# Patient Record
Sex: FEMALE | Race: WHITE | Employment: PART TIME
[De-identification: names, ages, dates, MRNs, and addresses within clinical notes are randomized per-mention and may not be internally consistent; named-entity substitution may affect disease eponyms.]

---

## 2017-01-10 ENCOUNTER — OFFICE VISIT (OUTPATIENT)
Dept: OBGYN | Facility: CLINIC | Age: 47
End: 2017-01-10

## 2017-01-10 VITALS
BODY MASS INDEX: 24.59 KG/M2 | DIASTOLIC BLOOD PRESSURE: 77 MMHG | SYSTOLIC BLOOD PRESSURE: 127 MMHG | HEIGHT: 66 IN | HEART RATE: 74 BPM | WEIGHT: 153 LBS

## 2017-01-10 DIAGNOSIS — N89.8 VAGINAL DISCHARGE: Primary | ICD-10-CM

## 2017-01-10 PROCEDURE — 99213 OFFICE O/P EST LOW 20 MIN: CPT | Performed by: OBSTETRICS & GYNECOLOGY

## 2017-02-27 ENCOUNTER — HOSPITAL ENCOUNTER (OUTPATIENT)
Dept: MAMMOGRAPHY | Age: 47
Discharge: HOME | End: 2017-02-27
Payer: COMMERCIAL

## 2017-02-27 DIAGNOSIS — Z12.39 BREAST CANCER SCREENING: ICD-10-CM

## 2017-02-27 PROCEDURE — G0202 SCR MAMMO BI INCL CAD: HCPCS

## 2017-02-27 PROCEDURE — 77067 SCR MAMMO BI INCL CAD: CPT | Performed by: RADIOLOGY

## 2017-06-07 ENCOUNTER — HOSPITAL ENCOUNTER (OUTPATIENT)
Dept: PULMONOLOGY | Age: 47
Discharge: HOME | End: 2017-06-07
Payer: COMMERCIAL

## 2017-09-13 ENCOUNTER — HOSPITAL ENCOUNTER (OUTPATIENT)
Dept: PULMONOLOGY | Age: 47
Discharge: HOME | End: 2017-09-13
Payer: COMMERCIAL

## 2017-09-13 PROCEDURE — 94726 PLETHYSMOGRAPHY LUNG VOLUMES: CPT

## 2017-09-13 PROCEDURE — 94727 GAS DIL/WSHOT DETER LNG VOL: CPT

## 2017-09-13 PROCEDURE — 94010 BREATHING CAPACITY TEST: CPT

## 2017-09-13 PROCEDURE — 94729 DIFFUSING CAPACITY: CPT

## 2017-09-13 PROCEDURE — 88738 HGB QUANT TRANSCUTANEOUS: CPT

## 2017-09-13 PROCEDURE — 94728 AIRWY RESIST BY OSCILLOMETRY: CPT

## 2017-09-29 ENCOUNTER — HOSPITAL ENCOUNTER (OUTPATIENT)
Age: 47
Discharge: HOME | End: 2017-09-29
Payer: COMMERCIAL

## 2017-09-29 LAB
ABSOLUTE EOS #: 0.1 K/UL (ref 0–0.4)
ABSOLUTE LYMPH #: 1.6 K/UL (ref 1–4.8)
ABSOLUTE MONO #: 0.3 K/UL (ref 0.1–1.2)
ALBUMIN SERPL-MCNC: 4.1 G/DL (ref 3.5–5.2)
ALBUMIN/GLOBULIN RATIO: 1.6 (ref 1–2.5)
ALP BLD-CCNC: 44 U/L (ref 35–104)
ALT SERPL-CCNC: 16 U/L (ref 5–33)
ANION GAP SERPL CALCULATED.3IONS-SCNC: 12 MMOL/L (ref 9–17)
AST SERPL-CCNC: 18 U/L
BASOPHILS # BLD: 1 %
BASOPHILS ABSOLUTE: 0 K/UL (ref 0–0.2)
BILIRUB SERPL-MCNC: 0.79 MG/DL (ref 0.3–1.2)
BUN BLDV-MCNC: 17 MG/DL (ref 6–20)
BUN/CREAT BLD: NORMAL (ref 9–20)
CALCIUM SERPL-MCNC: 9.2 MG/DL (ref 8.6–10.4)
CHLORIDE BLD-SCNC: 101 MMOL/L (ref 98–107)
CHOLESTEROL, FASTING: 261 MG/DL
CHOLESTEROL/HDL RATIO: 2.6
CO2: 24 MMOL/L (ref 20–31)
CREAT SERPL-MCNC: 0.64 MG/DL (ref 0.5–0.9)
DIFFERENTIAL TYPE: NORMAL
EOSINOPHILS RELATIVE PERCENT: 2 %
GFR AFRICAN AMERICAN: >60 ML/MIN
GFR NON-AFRICAN AMERICAN: >60 ML/MIN
GFR SERPL CREATININE-BSD FRML MDRD: NORMAL ML/MIN/{1.73_M2}
GFR SERPL CREATININE-BSD FRML MDRD: NORMAL ML/MIN/{1.73_M2}
GLUCOSE BLD-MCNC: 88 MG/DL (ref 70–99)
HCT VFR BLD CALC: 42.4 % (ref 36–46)
HDLC SERPL-MCNC: 102 MG/DL
HEMOGLOBIN: 14.3 G/DL (ref 12–16)
LDL CHOLESTEROL: 152 MG/DL (ref 0–130)
LYMPHOCYTES # BLD: 29 %
MCH RBC QN AUTO: 31.5 PG (ref 26–34)
MCHC RBC AUTO-ENTMCNC: 33.6 G/DL (ref 31–37)
MCV RBC AUTO: 93.7 FL (ref 80–100)
MONOCYTES # BLD: 5 %
PDW BLD-RTO: 13.4 % (ref 12.5–15.4)
PLATELET # BLD: 225 K/UL (ref 140–450)
PLATELET ESTIMATE: NORMAL
PMV BLD AUTO: 8.2 FL (ref 6–12)
POTASSIUM SERPL-SCNC: 4.4 MMOL/L (ref 3.7–5.3)
RBC # BLD: 4.53 M/UL (ref 4–5.2)
RBC # BLD: NORMAL 10*6/UL
SEG NEUTROPHILS: 63 %
SEGMENTED NEUTROPHILS ABSOLUTE COUNT: 3.6 K/UL (ref 1.8–7.7)
SODIUM BLD-SCNC: 137 MMOL/L (ref 135–144)
T3 FREE: 2.2 PG/ML (ref 2.02–4.43)
THYROXINE, FREE: 1.08 NG/DL (ref 0.93–1.7)
TOTAL PROTEIN: 6.7 G/DL (ref 6.4–8.3)
TRIGLYCERIDE, FASTING: 35 MG/DL
TSH SERPL DL<=0.05 MIU/L-ACNC: 2.6 MIU/L (ref 0.3–5)
VITAMIN D 25-HYDROXY: 44.5 NG/ML (ref 30–100)
VLDLC SERPL CALC-MCNC: ABNORMAL MG/DL (ref 1–30)
WBC # BLD: 5.6 K/UL (ref 3.5–11)
WBC # BLD: NORMAL 10*3/UL

## 2017-09-29 PROCEDURE — 85025 COMPLETE CBC W/AUTO DIFF WBC: CPT

## 2017-09-29 PROCEDURE — 36415 COLL VENOUS BLD VENIPUNCTURE: CPT

## 2017-09-29 PROCEDURE — 84481 FREE ASSAY (FT-3): CPT

## 2017-09-29 PROCEDURE — 80053 COMPREHEN METABOLIC PANEL: CPT

## 2017-09-29 PROCEDURE — 80061 LIPID PANEL: CPT

## 2017-09-29 PROCEDURE — 84443 ASSAY THYROID STIM HORMONE: CPT

## 2017-09-29 PROCEDURE — 84439 ASSAY OF FREE THYROXINE: CPT

## 2017-09-29 PROCEDURE — 82306 VITAMIN D 25 HYDROXY: CPT

## 2017-10-05 ENCOUNTER — OFFICE VISIT (OUTPATIENT)
Dept: OBGYN CLINIC | Age: 47
End: 2017-10-05
Payer: COMMERCIAL

## 2017-10-05 ENCOUNTER — HOSPITAL ENCOUNTER (OUTPATIENT)
Age: 47
Setting detail: SPECIMEN
Discharge: HOME | End: 2017-10-05
Payer: COMMERCIAL

## 2017-10-05 VITALS
DIASTOLIC BLOOD PRESSURE: 79 MMHG | WEIGHT: 149 LBS | RESPIRATION RATE: 16 BRPM | BODY MASS INDEX: 23.95 KG/M2 | SYSTOLIC BLOOD PRESSURE: 128 MMHG | OXYGEN SATURATION: 100 % | HEIGHT: 66 IN

## 2017-10-05 DIAGNOSIS — Z01.419 ENCOUNTER FOR ROUTINE GYNECOLOGICAL EXAMINATION WITH PAPANICOLAOU SMEAR OF CERVIX: Primary | ICD-10-CM

## 2017-10-05 PROCEDURE — 99396 PREV VISIT EST AGE 40-64: CPT | Performed by: OBSTETRICS & GYNECOLOGY

## 2017-10-05 NOTE — PROGRESS NOTES
PHYSICAL Exam:     Constitutional:  Blood pressure 128/79, resp. rate 16, height 5' 6\" (1.676 m), weight 149 lb (67.6 kg), last menstrual period 10/01/2017, SpO2 100 %, not currently breastfeeding. General Appearance: This  is a well Developed, well Nourished, well groomed female. Her BMI was reviewed. Nutritional decision making was discussed. Skin:  There was a Normal Inspection of the skin without rashes or lesions. There were no rashes. (Papular, Maculopapular, Hives, Pustular, Macular)     There were no lesions (Ulcers, Erythema, Abn. Appearing Nevi)        Lymphatic:  No Lymph Nodes were Palpable in the neck , axilla or groin. Inguinal lymph nodes wnl   Neck and EENT:  The neck was supple. There were no masses   The thyroid was not enlarged and had no masses. Perrla, EOMI B/L,   Respiratory: The lungs were auscultated and found to be clear. There were no rales, rhonchi or wheezes. There was a good respiratory effort. Cardiovascular: The heart was in a regular rate and rhythm. . No S3 or S4. There was no murmur appreciated. Location, grade, and radiation are not applicable. Extremities: The patients extremities were without calf tenderness, edema, or varicosities. There was full range of motion in all four extremities    Abdomen: The abdomen was soft and non-tender. There were good bowel sounds in all quadrants and there was no guarding, rebound or rigidity. On evaluation there was no evidence of hepatosplenomegaly and there was no costal vertebral david tenderness bilaterally. No hernias were appreciated. Abdominal Scars: healed    Psych: The patient had a normal Orientation to: Time, Place, Person, and Situation  There is no Mood / Affect changes    Breast:  (Chest)  normal appearance, no masses or tenderness  Self breast exams were reviewed in detail. Literature was given.     Pelvic Exam:  Vulva and vagina appear normal. Bimanual exam reveals normal uterus and adnexa. Rectal Exam:  def      Musculosk:  Normal Gait and station was noted. Digits were evaluated without abnormal findings. Range of motion, stability and strength were evaluated and found to be appropriate for the patients age. ASSESSMENT:      55 y.o. Annual  1. Encounter for routine gynecological examination with Papanicolaou smear of cervix         Chief Complaint   Patient presents with    Gynecologic Exam     4/2013 wnl last jacinto was 2/27/17 wnl           Past Medical History:   Diagnosis Date    Anemia     Asthma     Lactose intolerance     Menometrorrhagia     Menorrhagia     Miscarriage          Patient Active Problem List   Diagnosis    Urinary incontinence          Hereditary Breast, Ovarian, Colon and Uterine Cancer screening Done. Tobacco & Secondary smoke risks reviewed; instructed on cessation and avoidance      Counseling Completed: Annual  Labs for MB and tailor therapy with his lab           PLAN:    Repeat pap every 1 year if negative. Mammograms every 1 year. If 37 yo and last mammogram was negative. Calcium and Vitamin D dosing reviewed. Colonoscopy screening reviewed as well as onset for bone density testing. Birth control and barrier recommendations discussed. STD counseling and prevention reviewed. Gardisil counseling completed for all patients 7-33 yo. Routine health maintenance per patients PCP.

## 2017-10-12 LAB — CYTOLOGY REPORT: NORMAL

## 2017-10-25 ENCOUNTER — HOSPITAL ENCOUNTER (OUTPATIENT)
Age: 47
Discharge: HOME | End: 2017-10-25
Payer: COMMERCIAL

## 2017-10-25 LAB
CORTISOL COLLECTION INFO: NORMAL
CORTISOL: 15.3 UG/DL (ref 2.7–18.4)
ESTRADIOL LEVEL: 68 PG/ML (ref 27–314)
PROGESTERONE LEVEL: 5.26 NG/ML
T3 FREE: 2.34 PG/ML (ref 2.02–4.43)
TESTOSTERONE TOTAL: 22 NG/DL (ref 20–70)
THYROGLOBULIN AB: <20 IU/ML (ref 0–40)
THYROGLOBULIN: 28.4 NG/ML (ref 0–63.4)
THYROID PEROXIDASE (TPO) AB: <10 IU/ML (ref 0–35)
THYROXINE, FREE: 1.24 NG/DL (ref 0.93–1.7)
TSH SERPL DL<=0.05 MIU/L-ACNC: 1.76 MIU/L (ref 0.3–5)
VITAMIN D 25-HYDROXY: 48.9 NG/ML (ref 30–100)

## 2017-10-25 PROCEDURE — 82306 VITAMIN D 25 HYDROXY: CPT

## 2017-10-25 PROCEDURE — 86376 MICROSOMAL ANTIBODY EACH: CPT

## 2017-10-25 PROCEDURE — 84439 ASSAY OF FREE THYROXINE: CPT

## 2017-10-25 PROCEDURE — 82679 ASSAY OF ESTRONE: CPT

## 2017-10-25 PROCEDURE — 84482 T3 REVERSE: CPT

## 2017-10-25 PROCEDURE — 86800 THYROGLOBULIN ANTIBODY: CPT

## 2017-10-25 PROCEDURE — 82627 DEHYDROEPIANDROSTERONE: CPT

## 2017-10-25 PROCEDURE — 84481 FREE ASSAY (FT-3): CPT

## 2017-10-25 PROCEDURE — 82670 ASSAY OF TOTAL ESTRADIOL: CPT

## 2017-10-25 PROCEDURE — 84432 ASSAY OF THYROGLOBULIN: CPT

## 2017-10-25 PROCEDURE — 84403 ASSAY OF TOTAL TESTOSTERONE: CPT

## 2017-10-25 PROCEDURE — 84443 ASSAY THYROID STIM HORMONE: CPT

## 2017-10-25 PROCEDURE — 36415 COLL VENOUS BLD VENIPUNCTURE: CPT

## 2017-10-25 PROCEDURE — 82533 TOTAL CORTISOL: CPT

## 2017-10-25 PROCEDURE — 84144 ASSAY OF PROGESTERONE: CPT

## 2017-10-27 LAB — DHEAS (DHEA SULFATE): 79.1 UG/DL (ref 32–240)

## 2017-10-29 LAB
ESTRONE: 59.2 PG/ML
T3 REVERSE: 17.5 NG/DL (ref 9–27)

## 2017-12-19 ENCOUNTER — HOSPITAL ENCOUNTER (OUTPATIENT)
Age: 47
Discharge: HOME | End: 2017-12-19
Payer: COMMERCIAL

## 2017-12-19 ENCOUNTER — HOSPITAL ENCOUNTER (OUTPATIENT)
Dept: ULTRASOUND IMAGING | Age: 47
Discharge: HOME | End: 2017-12-19
Payer: COMMERCIAL

## 2017-12-19 DIAGNOSIS — Z83.49 FAMILY HISTORY OF HYPOTHYROIDISM: ICD-10-CM

## 2017-12-19 DIAGNOSIS — R53.83 OTHER FATIGUE: ICD-10-CM

## 2017-12-19 LAB
C-REACTIVE PROTEIN: 0.5 MG/L (ref 0–5)
HOMOCYSTEINE: 6.3 UMOL/L
INSULIN COMMENT: NORMAL
INSULIN REFERENCE RANGE:: NORMAL
INSULIN: 5.7 MU/L

## 2017-12-19 PROCEDURE — 86140 C-REACTIVE PROTEIN: CPT

## 2017-12-19 PROCEDURE — 76536 US EXAM OF HEAD AND NECK: CPT

## 2017-12-19 PROCEDURE — 36415 COLL VENOUS BLD VENIPUNCTURE: CPT

## 2017-12-19 PROCEDURE — 83090 ASSAY OF HOMOCYSTEINE: CPT

## 2017-12-19 PROCEDURE — 83036 HEMOGLOBIN GLYCOSYLATED A1C: CPT

## 2017-12-19 PROCEDURE — 83525 ASSAY OF INSULIN: CPT

## 2017-12-20 LAB
ESTIMATED AVERAGE GLUCOSE: 82 MG/DL
HBA1C MFR BLD: 4.5 % (ref 4–6)

## 2018-10-10 ENCOUNTER — HOSPITAL ENCOUNTER (OUTPATIENT)
Age: 48
Setting detail: SPECIMEN
Discharge: HOME | End: 2018-10-10
Payer: COMMERCIAL

## 2018-10-10 ENCOUNTER — OFFICE VISIT (OUTPATIENT)
Dept: OBGYN CLINIC | Age: 48
End: 2018-10-10
Payer: COMMERCIAL

## 2018-10-10 VITALS
SYSTOLIC BLOOD PRESSURE: 122 MMHG | HEIGHT: 66 IN | WEIGHT: 156 LBS | DIASTOLIC BLOOD PRESSURE: 79 MMHG | BODY MASS INDEX: 25.07 KG/M2 | HEART RATE: 89 BPM

## 2018-10-10 DIAGNOSIS — Z12.39 SCREENING FOR BREAST CANCER: ICD-10-CM

## 2018-10-10 DIAGNOSIS — Z01.419 WELL FEMALE EXAM WITH ROUTINE GYNECOLOGICAL EXAM: Primary | ICD-10-CM

## 2018-10-10 PROCEDURE — 99396 PREV VISIT EST AGE 40-64: CPT | Performed by: OBSTETRICS & GYNECOLOGY

## 2018-10-10 NOTE — PROGRESS NOTES
Reversible Birth Control: No        Hormone Replacement Exposure: No      Family History of Breast, Ovarian , Colon or Uterine Cancer: No   If YES see scanned worksheet. Preventative Health Testing:  Date of Last Pap Smear: 2017  Abnormal Pap Smear History:   Colposcopy History:   Date of Last Mammogram: due  Date of Last Colonoscopy:   Date of Last Bone Density:      ________________________________________________________________________  REVIEW OF SYSTEMS:        A minimum of an eleven point review of systems was completed and found to be negative except for the pertinent positives found below. Constitutional: No fever, chills or malaise; No weight change or fatigue  Head and Eyes: No vision, Headache, Dizziness or trauma in last 12 months  ENT ROS: No hearing, Tinnitis, sinus or taste problems  Hematological and Lymphatic ROS:No Lymphoma, Von Willebrand's, Hemophillia or Bleeding History  Psych ROS: No Depression, Homicidal thoughts,suicidal thoughts, or anxiety  Breast ROS: No prior breast abnormalities or lumps  Respiratory ROS: No SOB, Pneumoniae,Cough, or Pulmonary Embolism History  Cardiovascular ROS: No Chest Pain with Exertion, Palpitations, Syncope, Edema, Arrhythmia  Gastrointestinal ROS: No Indigestion, Heartburn, Nausea, vomiting, Diahrea, Constipation,or Bowel Changes; No Bloody Stools or melena  Genito-Urinary ROS: No Dysuria, Hematuria or Nocturia.  No Urinary Incontinence or Vaginal Discharge  Musculoskeletal ROS: No Arthralgia, Arthritis,Gout,Osteoporosis or Rheumatism  Neurological ROS: No CVA, Migraines, Epilepsy, Seizure Hx, or Limb Weakness  Dermatological ROS: No Rash, Itching, Hives, Mole Changes or Cancer                                                                                                                                                                                                                                PHYSICAL Exam:     Constitutional:  Blood pressure 122/79,

## 2018-10-11 ENCOUNTER — HOSPITAL ENCOUNTER (OUTPATIENT)
Dept: MAMMOGRAPHY | Age: 48
Discharge: HOME | End: 2018-10-13
Payer: COMMERCIAL

## 2018-10-11 DIAGNOSIS — Z12.39 SCREENING FOR BREAST CANCER: ICD-10-CM

## 2018-10-11 DIAGNOSIS — Z01.419 WELL FEMALE EXAM WITH ROUTINE GYNECOLOGICAL EXAM: ICD-10-CM

## 2018-10-11 PROCEDURE — 77067 SCR MAMMO BI INCL CAD: CPT

## 2018-10-15 ENCOUNTER — TELEPHONE (OUTPATIENT)
Dept: OBGYN CLINIC | Age: 48
End: 2018-10-15

## 2018-10-15 DIAGNOSIS — R92.2 DENSE BREAST TISSUE ON MAMMOGRAM: Primary | ICD-10-CM

## 2018-10-16 ENCOUNTER — HOSPITAL ENCOUNTER (OUTPATIENT)
Dept: MAMMOGRAPHY | Age: 48
Discharge: HOME | End: 2018-10-18
Payer: COMMERCIAL

## 2018-10-16 DIAGNOSIS — R92.2 DENSE BREAST TISSUE ON MAMMOGRAM: ICD-10-CM

## 2018-10-16 PROCEDURE — G0279 TOMOSYNTHESIS, MAMMO: HCPCS

## 2018-10-25 LAB — CYTOLOGY REPORT: NORMAL

## 2018-12-21 ENCOUNTER — OFFICE VISIT (OUTPATIENT)
Dept: PRIMARY CARE CLINIC | Age: 48
End: 2018-12-21
Payer: COMMERCIAL

## 2018-12-21 ENCOUNTER — HOSPITAL ENCOUNTER (OUTPATIENT)
Age: 48
Setting detail: SPECIMEN
Discharge: HOME | End: 2018-12-21
Payer: COMMERCIAL

## 2018-12-21 VITALS
TEMPERATURE: 99 F | SYSTOLIC BLOOD PRESSURE: 125 MMHG | DIASTOLIC BLOOD PRESSURE: 84 MMHG | OXYGEN SATURATION: 99 % | WEIGHT: 160 LBS | BODY MASS INDEX: 25.82 KG/M2 | HEART RATE: 86 BPM

## 2018-12-21 DIAGNOSIS — R30.0 DYSURIA: ICD-10-CM

## 2018-12-21 DIAGNOSIS — N30.00 ACUTE CYSTITIS WITHOUT HEMATURIA: ICD-10-CM

## 2018-12-21 DIAGNOSIS — J40 BRONCHITIS: Primary | ICD-10-CM

## 2018-12-21 LAB
BILIRUBIN, POC: NEGATIVE
BLOOD URINE, POC: NEGATIVE
CLARITY, POC: ABNORMAL
COLOR, POC: YELLOW
GLUCOSE URINE, POC: NEGATIVE
KETONES, POC: NEGATIVE
LEUKOCYTE EST, POC: POSITIVE
NITRITE, POC: ABNORMAL
PH, POC: 7.5
PROTEIN, POC: NEGATIVE
SPECIFIC GRAVITY, POC: 1
UROBILINOGEN, POC: 0.2

## 2018-12-21 PROCEDURE — 99213 OFFICE O/P EST LOW 20 MIN: CPT | Performed by: NURSE PRACTITIONER

## 2018-12-21 PROCEDURE — 81003 URINALYSIS AUTO W/O SCOPE: CPT | Performed by: NURSE PRACTITIONER

## 2018-12-21 RX ORDER — ALBUTEROL SULFATE 90 UG/1
AEROSOL, METERED RESPIRATORY (INHALATION)
COMMUNITY
End: 2019-03-20 | Stop reason: SDUPTHER

## 2018-12-21 RX ORDER — LEVOFLOXACIN 500 MG/1
500 TABLET, FILM COATED ORAL DAILY
Qty: 7 TABLET | Refills: 0 | Status: SHIPPED | OUTPATIENT
Start: 2018-12-21 | End: 2018-12-28

## 2018-12-21 RX ORDER — ALBUTEROL SULFATE 90 UG/1
2 AEROSOL, METERED RESPIRATORY (INHALATION) EVERY 6 HOURS PRN
Qty: 1 INHALER | Refills: 0 | Status: SHIPPED | OUTPATIENT
Start: 2018-12-21

## 2018-12-21 RX ORDER — PREDNISONE 20 MG/1
40 TABLET ORAL DAILY
Qty: 10 TABLET | Refills: 0 | Status: SHIPPED | OUTPATIENT
Start: 2018-12-21 | End: 2018-12-26

## 2018-12-21 ASSESSMENT — ENCOUNTER SYMPTOMS
SORE THROAT: 0
COUGH: 1
SINUS PRESSURE: 0
WHEEZING: 1
SHORTNESS OF BREATH: 0

## 2018-12-21 NOTE — PROGRESS NOTES
Visit Information    Have you changed or started any medications since your last visit including any over-the-counter medicines, vitamins, or herbal medicines? no   Are you having any side effects from any of your medications? -  no  Have you stopped taking any of your medications? Is so, why? -  no    Have you seen any other physician or provider since your last visit? No  Have you had any other diagnostic tests since your last visit? No  Have you been seen in the emergency room and/or had an admission to a hospital since we last saw you? No  Have you had your routine dental cleaning in the past 6 months? yes -    Have you activated your Variable account? If not, what are your barriers?  No     Patient Care Team:  Myla Hernandez MD as PCP - General (Family Medicine)    Medical History Review  Past Medical, Family, and Social History reviewed and does not contribute to the patient presenting condition    Health Maintenance   Topic Date Due    HIV screen  10/18/1985    DTaP/Tdap/Td vaccine (1 - Tdap) 10/18/1989    Cervical cancer screen  10/10/2021    Lipid screen  09/29/2022    Flu vaccine  Completed

## 2018-12-23 LAB
CULTURE: ABNORMAL
Lab: ABNORMAL
ORGANISM: ABNORMAL
SPECIMEN DESCRIPTION: ABNORMAL
STATUS: ABNORMAL

## 2018-12-28 ENCOUNTER — HOSPITAL ENCOUNTER (OUTPATIENT)
Age: 48
Discharge: HOME | End: 2018-12-30
Payer: COMMERCIAL

## 2018-12-28 ENCOUNTER — HOSPITAL ENCOUNTER (OUTPATIENT)
Dept: GENERAL RADIOLOGY | Age: 48
Discharge: HOME | End: 2018-12-30
Payer: COMMERCIAL

## 2018-12-28 DIAGNOSIS — M54.2 NECK PAIN: ICD-10-CM

## 2018-12-28 DIAGNOSIS — M54.42 ACUTE BACK PAIN WITH SCIATICA, LEFT: ICD-10-CM

## 2018-12-28 PROCEDURE — 72040 X-RAY EXAM NECK SPINE 2-3 VW: CPT

## 2018-12-28 PROCEDURE — 72100 X-RAY EXAM L-S SPINE 2/3 VWS: CPT

## 2019-02-27 ENCOUNTER — HOSPITAL ENCOUNTER (OUTPATIENT)
Age: 49
Discharge: HOME | End: 2019-02-27
Payer: COMMERCIAL

## 2019-02-27 LAB
-: ABNORMAL
AMORPHOUS: ABNORMAL
BACTERIA: ABNORMAL
BILIRUBIN URINE: NEGATIVE
CASTS UA: ABNORMAL /LPF (ref 0–2)
COLOR: YELLOW
COMMENT UA: ABNORMAL
CRYSTALS, UA: ABNORMAL /HPF
EPITHELIAL CELLS UA: ABNORMAL /HPF (ref 0–5)
GLUCOSE URINE: NEGATIVE
KETONES, URINE: ABNORMAL
LEUKOCYTE ESTERASE, URINE: ABNORMAL
MUCUS: ABNORMAL
NITRITE, URINE: NEGATIVE
OTHER OBSERVATIONS UA: ABNORMAL
PH UA: 8 (ref 5–8)
PROTEIN UA: ABNORMAL
RBC UA: ABNORMAL /HPF (ref 0–2)
RENAL EPITHELIAL, UA: ABNORMAL /HPF
SPECIFIC GRAVITY UA: 1.03 (ref 1–1.03)
TRICHOMONAS: ABNORMAL
TURBIDITY: CLEAR
URINE HGB: NEGATIVE
UROBILINOGEN, URINE: NORMAL
WBC UA: ABNORMAL /HPF (ref 0–5)
YEAST: ABNORMAL

## 2019-02-27 PROCEDURE — 87086 URINE CULTURE/COLONY COUNT: CPT

## 2019-02-27 PROCEDURE — 81001 URINALYSIS AUTO W/SCOPE: CPT

## 2019-02-28 LAB
CULTURE: NORMAL
Lab: NORMAL
SPECIMEN DESCRIPTION: NORMAL

## 2019-06-02 ENCOUNTER — HOSPITAL ENCOUNTER (OUTPATIENT)
Age: 49
Setting detail: SPECIMEN
Discharge: HOME | End: 2019-06-02
Payer: COMMERCIAL

## 2019-06-02 PROCEDURE — 87086 URINE CULTURE/COLONY COUNT: CPT

## 2019-06-02 PROCEDURE — 87077 CULTURE AEROBIC IDENTIFY: CPT

## 2019-06-02 PROCEDURE — 87186 SC STD MICRODIL/AGAR DIL: CPT

## 2019-06-03 LAB
CULTURE: ABNORMAL
Lab: ABNORMAL
SPECIMEN DESCRIPTION: ABNORMAL

## 2019-06-19 ENCOUNTER — HOSPITAL ENCOUNTER (OUTPATIENT)
Age: 49
Setting detail: SPECIMEN
Discharge: HOME | End: 2019-06-19
Payer: COMMERCIAL

## 2019-06-19 DIAGNOSIS — N39.0 RECURRENT UTI: ICD-10-CM

## 2019-06-19 PROBLEM — R39.15 URGENCY OF URINATION: Status: ACTIVE | Noted: 2019-06-19

## 2019-06-19 PROBLEM — R35.0 FREQUENCY OF URINATION: Status: ACTIVE | Noted: 2019-06-19

## 2019-06-20 LAB
CULTURE: NORMAL
Lab: NORMAL
SPECIMEN DESCRIPTION: NORMAL

## 2019-08-22 ENCOUNTER — HOSPITAL ENCOUNTER (OUTPATIENT)
Age: 49
Discharge: HOME | End: 2019-08-22
Payer: COMMERCIAL

## 2019-08-22 ENCOUNTER — HOSPITAL ENCOUNTER (OUTPATIENT)
Dept: ULTRASOUND IMAGING | Age: 49
Discharge: HOME | End: 2019-08-24
Payer: COMMERCIAL

## 2019-08-22 DIAGNOSIS — E03.8 SUBCLINICAL HYPOTHYROIDISM: ICD-10-CM

## 2019-08-22 LAB
ABSOLUTE EOS #: 0.09 K/UL (ref 0–0.44)
ABSOLUTE IMMATURE GRANULOCYTE: 0.03 K/UL (ref 0–0.3)
ABSOLUTE LYMPH #: 1.73 K/UL (ref 1.1–3.7)
ABSOLUTE MONO #: 0.34 K/UL (ref 0.1–1.2)
ALBUMIN SERPL-MCNC: 4.5 G/DL (ref 3.5–5.2)
ALBUMIN/GLOBULIN RATIO: 1.7 (ref 1–2.5)
ALP BLD-CCNC: 43 U/L (ref 35–104)
ALT SERPL-CCNC: 9 U/L (ref 5–33)
ANION GAP SERPL CALCULATED.3IONS-SCNC: 10 MMOL/L (ref 9–17)
AST SERPL-CCNC: 12 U/L
BASOPHILS # BLD: 1 % (ref 0–2)
BASOPHILS ABSOLUTE: 0.03 K/UL (ref 0–0.2)
BILIRUB SERPL-MCNC: 0.95 MG/DL (ref 0.3–1.2)
BUN BLDV-MCNC: 11 MG/DL (ref 6–20)
BUN/CREAT BLD: NORMAL (ref 9–20)
CALCIUM SERPL-MCNC: 9.4 MG/DL (ref 8.6–10.4)
CHLORIDE BLD-SCNC: 104 MMOL/L (ref 98–107)
CHOLESTEROL, FASTING: 214 MG/DL
CHOLESTEROL/HDL RATIO: 3.1
CO2: 24 MMOL/L (ref 20–31)
CREAT SERPL-MCNC: 0.62 MG/DL (ref 0.5–0.9)
DIFFERENTIAL TYPE: ABNORMAL
EOSINOPHILS RELATIVE PERCENT: 2 % (ref 1–4)
FERRITIN: 125 UG/L (ref 13–150)
GFR AFRICAN AMERICAN: >60 ML/MIN
GFR NON-AFRICAN AMERICAN: >60 ML/MIN
GFR SERPL CREATININE-BSD FRML MDRD: NORMAL ML/MIN/{1.73_M2}
GFR SERPL CREATININE-BSD FRML MDRD: NORMAL ML/MIN/{1.73_M2}
GLUCOSE FASTING: 83 MG/DL (ref 70–99)
HCT VFR BLD CALC: 47.2 % (ref 36.3–47.1)
HDLC SERPL-MCNC: 69 MG/DL
HEMOGLOBIN: 14.7 G/DL (ref 11.9–15.1)
IMMATURE GRANULOCYTES: 1 %
IRON SATURATION: 74 % (ref 20–55)
IRON: 198 UG/DL (ref 37–145)
LDL CHOLESTEROL: 130 MG/DL (ref 0–130)
LYMPHOCYTES # BLD: 30 % (ref 24–43)
MCH RBC QN AUTO: 29.9 PG (ref 25.2–33.5)
MCHC RBC AUTO-ENTMCNC: 31.1 G/DL (ref 28.4–34.8)
MCV RBC AUTO: 96.1 FL (ref 82.6–102.9)
MONOCYTES # BLD: 6 % (ref 3–12)
NRBC AUTOMATED: 0 PER 100 WBC
PDW BLD-RTO: 12 % (ref 11.8–14.4)
PLATELET # BLD: 263 K/UL (ref 138–453)
PLATELET ESTIMATE: ABNORMAL
PMV BLD AUTO: 10.6 FL (ref 8.1–13.5)
POTASSIUM SERPL-SCNC: 4.5 MMOL/L (ref 3.7–5.3)
RBC # BLD: 4.91 M/UL (ref 3.95–5.11)
RBC # BLD: ABNORMAL 10*6/UL
SEG NEUTROPHILS: 60 % (ref 36–65)
SEGMENTED NEUTROPHILS ABSOLUTE COUNT: 3.59 K/UL (ref 1.5–8.1)
SODIUM BLD-SCNC: 138 MMOL/L (ref 135–144)
T3 FREE: 3.46 PG/ML (ref 2.02–4.43)
THYROXINE, FREE: 1.32 NG/DL (ref 0.93–1.7)
TOTAL IRON BINDING CAPACITY: 266 UG/DL (ref 250–450)
TOTAL PROTEIN: 7.2 G/DL (ref 6.4–8.3)
TRIGLYCERIDE, FASTING: 77 MG/DL
TSH SERPL DL<=0.05 MIU/L-ACNC: 2.1 MIU/L (ref 0.3–5)
UNSATURATED IRON BINDING CAPACITY: 68 UG/DL (ref 112–347)
VITAMIN B-12: 408 PG/ML (ref 232–1245)
VITAMIN D 25-HYDROXY: 35.4 NG/ML (ref 30–100)
VLDLC SERPL CALC-MCNC: ABNORMAL MG/DL (ref 1–30)
WBC # BLD: 5.8 K/UL (ref 3.5–11.3)
WBC # BLD: ABNORMAL 10*3/UL

## 2019-08-22 PROCEDURE — 82607 VITAMIN B-12: CPT

## 2019-08-22 PROCEDURE — 84445 ASSAY OF TSI GLOBULIN: CPT

## 2019-08-22 PROCEDURE — 84481 FREE ASSAY (FT-3): CPT

## 2019-08-22 PROCEDURE — 82728 ASSAY OF FERRITIN: CPT

## 2019-08-22 PROCEDURE — 85025 COMPLETE CBC W/AUTO DIFF WBC: CPT

## 2019-08-22 PROCEDURE — 83540 ASSAY OF IRON: CPT

## 2019-08-22 PROCEDURE — 84482 T3 REVERSE: CPT

## 2019-08-22 PROCEDURE — 36415 COLL VENOUS BLD VENIPUNCTURE: CPT

## 2019-08-22 PROCEDURE — 80061 LIPID PANEL: CPT

## 2019-08-22 PROCEDURE — 84439 ASSAY OF FREE THYROXINE: CPT

## 2019-08-22 PROCEDURE — 84443 ASSAY THYROID STIM HORMONE: CPT

## 2019-08-22 PROCEDURE — 86376 MICROSOMAL ANTIBODY EACH: CPT

## 2019-08-22 PROCEDURE — 80053 COMPREHEN METABOLIC PANEL: CPT

## 2019-08-22 PROCEDURE — 76536 US EXAM OF HEAD AND NECK: CPT

## 2019-08-22 PROCEDURE — 83550 IRON BINDING TEST: CPT

## 2019-08-22 PROCEDURE — 86800 THYROGLOBULIN ANTIBODY: CPT

## 2019-08-22 PROCEDURE — 82306 VITAMIN D 25 HYDROXY: CPT

## 2019-08-23 LAB
THYROGLOBULIN AB: <20 IU/ML (ref 0–40)
THYROID PEROXIDASE (TPO) AB: 10.7 IU/ML (ref 0–35)

## 2019-08-27 LAB — THYROID STIMULATING IMMUNOGLOB: 98 %

## 2019-08-29 LAB — T3 REVERSE: 14.6 NG/DL (ref 9–27)

## 2019-10-15 ENCOUNTER — OFFICE VISIT (OUTPATIENT)
Dept: OBGYN CLINIC | Age: 49
End: 2019-10-15
Payer: COMMERCIAL

## 2019-10-15 ENCOUNTER — HOSPITAL ENCOUNTER (OUTPATIENT)
Age: 49
Setting detail: SPECIMEN
Discharge: HOME | End: 2019-10-15
Payer: COMMERCIAL

## 2019-10-15 VITALS
SYSTOLIC BLOOD PRESSURE: 118 MMHG | WEIGHT: 155 LBS | BODY MASS INDEX: 24.91 KG/M2 | HEIGHT: 66 IN | DIASTOLIC BLOOD PRESSURE: 72 MMHG

## 2019-10-15 DIAGNOSIS — Z01.419 ENCOUNTER FOR GYNECOLOGICAL EXAMINATION WITHOUT ABNORMAL FINDING: Primary | ICD-10-CM

## 2019-10-15 DIAGNOSIS — Z12.31 VISIT FOR SCREENING MAMMOGRAM: ICD-10-CM

## 2019-10-15 PROCEDURE — 99396 PREV VISIT EST AGE 40-64: CPT | Performed by: OBSTETRICS & GYNECOLOGY

## 2019-10-15 RX ORDER — CHOLECALCIFEROL (VITAMIN D3) 125 MCG
500 CAPSULE ORAL DAILY
COMMUNITY

## 2019-10-18 LAB — CYTOLOGY REPORT: NORMAL

## 2019-11-12 ENCOUNTER — HOSPITAL ENCOUNTER (OUTPATIENT)
Dept: MAMMOGRAPHY | Age: 49
Discharge: HOME | End: 2019-11-14
Payer: COMMERCIAL

## 2019-11-12 DIAGNOSIS — Z12.31 VISIT FOR SCREENING MAMMOGRAM: ICD-10-CM

## 2019-11-12 PROCEDURE — 77063 BREAST TOMOSYNTHESIS BI: CPT

## 2019-12-11 ENCOUNTER — HOSPITAL ENCOUNTER (OUTPATIENT)
Age: 49
Discharge: HOME | End: 2019-12-11
Payer: COMMERCIAL

## 2019-12-11 LAB
ABSOLUTE EOS #: 0.18 K/UL (ref 0–0.44)
ABSOLUTE EOS #: 0.18 K/UL (ref 0–0.44)
ABSOLUTE IMMATURE GRANULOCYTE: <0.03 K/UL (ref 0–0.3)
ABSOLUTE IMMATURE GRANULOCYTE: <0.03 K/UL (ref 0–0.3)
ABSOLUTE LYMPH #: 1.28 K/UL (ref 1.1–3.7)
ABSOLUTE LYMPH #: 1.28 K/UL (ref 1.1–3.7)
ABSOLUTE MONO #: 0.34 K/UL (ref 0.1–1.2)
ABSOLUTE MONO #: 0.34 K/UL (ref 0.1–1.2)
ALBUMIN SERPL-MCNC: 4.2 G/DL (ref 3.5–5.2)
ALBUMIN/GLOBULIN RATIO: 1.7 (ref 1–2.5)
ALP BLD-CCNC: 48 U/L (ref 35–104)
ALT SERPL-CCNC: 14 U/L (ref 5–33)
ANION GAP SERPL CALCULATED.3IONS-SCNC: 10 MMOL/L (ref 9–17)
AST SERPL-CCNC: 16 U/L
BASOPHILS # BLD: 1 % (ref 0–2)
BASOPHILS # BLD: 1 % (ref 0–2)
BASOPHILS ABSOLUTE: 0.03 K/UL (ref 0–0.2)
BASOPHILS ABSOLUTE: 0.03 K/UL (ref 0–0.2)
BILIRUB SERPL-MCNC: 0.5 MG/DL (ref 0.3–1.2)
BILIRUBIN DIRECT: 0.13 MG/DL
BILIRUBIN, INDIRECT: 0.37 MG/DL (ref 0–1)
CERULOPLASMIN: 21 MG/DL (ref 16–45)
CHLORIDE BLD-SCNC: 108 MMOL/L (ref 98–107)
CO2: 24 MMOL/L (ref 20–31)
DIFFERENTIAL TYPE: ABNORMAL
DIFFERENTIAL TYPE: ABNORMAL
EOSINOPHILS RELATIVE PERCENT: 5 % (ref 1–4)
EOSINOPHILS RELATIVE PERCENT: 5 % (ref 1–4)
ESTRADIOL LEVEL: 44 PG/ML (ref 27–314)
FERRITIN: 32 UG/L (ref 13–150)
GLOBULIN: NORMAL G/DL (ref 1.5–3.8)
HCT VFR BLD CALC: 45.2 % (ref 36.3–47.1)
HCT VFR BLD CALC: 45.2 % (ref 36.3–47.1)
HEMOGLOBIN: 14.5 G/DL (ref 11.9–15.1)
HEMOGLOBIN: 14.5 G/DL (ref 11.9–15.1)
HIGH SENSITIVE C-REACTIVE PROTEIN: 0.9 MG/L
HOMOCYSTEINE: 5.8 UMOL/L
IMMATURE GRANULOCYTES: 0 %
IMMATURE GRANULOCYTES: 0 %
IRON SATURATION: 39 % (ref 20–55)
IRON: 101 UG/DL (ref 37–145)
LYMPHOCYTES # BLD: 34 % (ref 24–43)
LYMPHOCYTES # BLD: 34 % (ref 24–43)
MCH RBC QN AUTO: 30.9 PG (ref 25.2–33.5)
MCH RBC QN AUTO: 30.9 PG (ref 25.2–33.5)
MCHC RBC AUTO-ENTMCNC: 32.1 G/DL (ref 28.4–34.8)
MCHC RBC AUTO-ENTMCNC: 32.1 G/DL (ref 28.4–34.8)
MCV RBC AUTO: 96.2 FL (ref 82.6–102.9)
MCV RBC AUTO: 96.2 FL (ref 82.6–102.9)
MONOCYTES # BLD: 9 % (ref 3–12)
MONOCYTES # BLD: 9 % (ref 3–12)
NRBC AUTOMATED: 0 PER 100 WBC
NRBC AUTOMATED: 0 PER 100 WBC
PDW BLD-RTO: 12.5 % (ref 11.8–14.4)
PDW BLD-RTO: 12.5 % (ref 11.8–14.4)
PLATELET # BLD: 256 K/UL (ref 138–453)
PLATELET # BLD: 256 K/UL (ref 138–453)
PLATELET ESTIMATE: ABNORMAL
PLATELET ESTIMATE: ABNORMAL
PMV BLD AUTO: 10.8 FL (ref 8.1–13.5)
PMV BLD AUTO: 10.8 FL (ref 8.1–13.5)
POTASSIUM SERPL-SCNC: 4.6 MMOL/L (ref 3.7–5.3)
PROGESTERONE LEVEL: 2.92 NG/ML
RBC # BLD: 4.7 M/UL (ref 3.95–5.11)
RBC # BLD: 4.7 M/UL (ref 3.95–5.11)
RBC # BLD: ABNORMAL 10*6/UL
RBC # BLD: ABNORMAL 10*6/UL
SEG NEUTROPHILS: 52 % (ref 36–65)
SEG NEUTROPHILS: 52 % (ref 36–65)
SEGMENTED NEUTROPHILS ABSOLUTE COUNT: 1.96 K/UL (ref 1.5–8.1)
SEGMENTED NEUTROPHILS ABSOLUTE COUNT: 1.96 K/UL (ref 1.5–8.1)
SEX HORMONE BINDING GLOBULIN: 123 NMOL/L (ref 30–135)
SODIUM BLD-SCNC: 142 MMOL/L (ref 135–144)
T3 FREE: 2.88 PG/ML (ref 2.02–4.43)
TESTOSTERONE FREE-NONMALE: <1 PG/ML (ref 1.1–5.8)
TESTOSTERONE TOTAL: 11 NG/DL (ref 20–70)
THYROXINE, FREE: 1.21 NG/DL (ref 0.93–1.7)
TOTAL IRON BINDING CAPACITY: 258 UG/DL (ref 250–450)
TOTAL PROTEIN: 6.7 G/DL (ref 6.4–8.3)
TSH SERPL DL<=0.05 MIU/L-ACNC: 1.65 MIU/L (ref 0.3–5)
UNSATURATED IRON BINDING CAPACITY: 157 UG/DL (ref 112–347)
VITAMIN B-12: 697 PG/ML (ref 232–1245)
WBC # BLD: 3.8 K/UL (ref 3.5–11.3)
WBC # BLD: 3.8 K/UL (ref 3.5–11.3)
WBC # BLD: ABNORMAL 10*3/UL
WBC # BLD: ABNORMAL 10*3/UL

## 2019-12-11 PROCEDURE — 82390 ASSAY OF CERULOPLASMIN: CPT

## 2019-12-11 PROCEDURE — 82627 DEHYDROEPIANDROSTERONE: CPT

## 2019-12-11 PROCEDURE — 84630 ASSAY OF ZINC: CPT

## 2019-12-11 PROCEDURE — 84443 ASSAY THYROID STIM HORMONE: CPT

## 2019-12-11 PROCEDURE — 87799 DETECT AGENT NOS DNA QUANT: CPT

## 2019-12-11 PROCEDURE — 84144 ASSAY OF PROGESTERONE: CPT

## 2019-12-11 PROCEDURE — 83540 ASSAY OF IRON: CPT

## 2019-12-11 PROCEDURE — 84403 ASSAY OF TOTAL TESTOSTERONE: CPT

## 2019-12-11 PROCEDURE — 86665 EPSTEIN-BARR CAPSID VCA: CPT

## 2019-12-11 PROCEDURE — 80076 HEPATIC FUNCTION PANEL: CPT

## 2019-12-11 PROCEDURE — 84439 ASSAY OF FREE THYROXINE: CPT

## 2019-12-11 PROCEDURE — 86663 EPSTEIN-BARR ANTIBODY: CPT

## 2019-12-11 PROCEDURE — 84270 ASSAY OF SEX HORMONE GLOBUL: CPT

## 2019-12-11 PROCEDURE — 86141 C-REACTIVE PROTEIN HS: CPT

## 2019-12-11 PROCEDURE — 85025 COMPLETE CBC W/AUTO DIFF WBC: CPT

## 2019-12-11 PROCEDURE — 83018 HEAVY METAL QUAN EACH NES: CPT

## 2019-12-11 PROCEDURE — 36415 COLL VENOUS BLD VENIPUNCTURE: CPT

## 2019-12-11 PROCEDURE — 83550 IRON BINDING TEST: CPT

## 2019-12-11 PROCEDURE — 82670 ASSAY OF TOTAL ESTRADIOL: CPT

## 2019-12-11 PROCEDURE — 83735 ASSAY OF MAGNESIUM: CPT

## 2019-12-11 PROCEDURE — 82728 ASSAY OF FERRITIN: CPT

## 2019-12-11 PROCEDURE — 86664 EPSTEIN-BARR NUCLEAR ANTIGEN: CPT

## 2019-12-11 PROCEDURE — 84481 FREE ASSAY (FT-3): CPT

## 2019-12-11 PROCEDURE — 83090 ASSAY OF HOMOCYSTEINE: CPT

## 2019-12-11 PROCEDURE — 84482 T3 REVERSE: CPT

## 2019-12-11 PROCEDURE — 82607 VITAMIN B-12: CPT

## 2019-12-11 PROCEDURE — 80051 ELECTROLYTE PANEL: CPT

## 2019-12-12 LAB
-: NORMAL
DHEAS (DHEA SULFATE): 61.1 UG/DL (ref 32–240)
EBV EARLY ANTIGEN IGG: 27 U/ML
EBV INTERPRETATION: ABNORMAL
EBV NUCLEAR AG AB: 701 U/ML
EPSTEIN-BARR VCA IGG: 1150 U/ML
EPSTEIN-BARR VCA IGM: 39 U/ML
REASON FOR REJECTION: NORMAL
ZZ NTE CLEAN UP: ORDERED TEST: NORMAL
ZZ NTE WITH NAME CLEAN UP: SPECIMEN SOURCE: NORMAL

## 2019-12-14 LAB
MISCELLANEOUS LAB TEST RESULT: NORMAL
TEST NAME: NORMAL

## 2019-12-15 LAB
T3 REVERSE: 13.6 NG/DL (ref 9–27)
ZINC: 70.1 UG/DL (ref 60–120)

## 2019-12-17 LAB
EBV, QUANT. COPY/ML: <390 CPY/ML
EBV, QUANT. INTERP: NOT DETECTED
EBV, QUANT. LOG: <2.6 LOG
EBV, QUANT. SOURCE: NORMAL

## 2019-12-19 ENCOUNTER — HOSPITAL ENCOUNTER (OUTPATIENT)
Age: 49
Discharge: HOME | End: 2019-12-19
Payer: COMMERCIAL

## 2019-12-19 PROCEDURE — 82525 ASSAY OF COPPER: CPT

## 2019-12-19 PROCEDURE — 83735 ASSAY OF MAGNESIUM: CPT

## 2019-12-19 PROCEDURE — 36415 COLL VENOUS BLD VENIPUNCTURE: CPT

## 2019-12-22 LAB — COPPER: 113.4 UG/DL (ref 80–155)

## 2019-12-23 LAB — MAGNESIUM RBC: 1.9 MMOL/L (ref 1.5–3.1)

## 2020-06-11 ENCOUNTER — HOSPITAL ENCOUNTER (OUTPATIENT)
Age: 50
Discharge: HOME | End: 2020-06-11
Payer: COMMERCIAL

## 2020-06-11 LAB
T3 FREE: 2.92 PG/ML (ref 2.02–4.43)
THYROXINE, FREE: 1.03 NG/DL (ref 0.93–1.7)
TSH SERPL DL<=0.05 MIU/L-ACNC: 0.75 MIU/L (ref 0.3–5)

## 2020-06-11 PROCEDURE — 84481 FREE ASSAY (FT-3): CPT

## 2020-06-11 PROCEDURE — 84439 ASSAY OF FREE THYROXINE: CPT

## 2020-06-11 PROCEDURE — 84482 T3 REVERSE: CPT

## 2020-06-11 PROCEDURE — 84443 ASSAY THYROID STIM HORMONE: CPT

## 2020-06-11 PROCEDURE — 36415 COLL VENOUS BLD VENIPUNCTURE: CPT

## 2020-06-14 LAB — T3 REVERSE: 15.1 NG/DL (ref 9–27)

## 2020-06-25 ENCOUNTER — HOSPITAL ENCOUNTER (OUTPATIENT)
Age: 50
Setting detail: SPECIMEN
Discharge: HOME | End: 2020-06-25
Payer: COMMERCIAL

## 2020-06-26 LAB — SARS-COV-2 ANTIBODY, TOTAL: NEGATIVE

## 2020-09-02 ENCOUNTER — HOSPITAL ENCOUNTER (OUTPATIENT)
Age: 50
Discharge: HOME | End: 2020-09-02
Payer: COMMERCIAL

## 2020-09-02 LAB
T3 FREE: 3.24 PG/ML (ref 2.02–4.43)
THYROXINE, FREE: 1.28 NG/DL (ref 0.93–1.7)
TSH SERPL DL<=0.05 MIU/L-ACNC: 0.08 MIU/L (ref 0.3–5)

## 2020-09-02 PROCEDURE — 84443 ASSAY THYROID STIM HORMONE: CPT

## 2020-09-02 PROCEDURE — 84439 ASSAY OF FREE THYROXINE: CPT

## 2020-09-02 PROCEDURE — 84481 FREE ASSAY (FT-3): CPT

## 2020-09-02 PROCEDURE — 36415 COLL VENOUS BLD VENIPUNCTURE: CPT

## 2020-11-24 ENCOUNTER — HOSPITAL ENCOUNTER (OUTPATIENT)
Age: 50
Setting detail: SPECIMEN
Discharge: HOME | End: 2020-11-24
Payer: COMMERCIAL

## 2020-11-24 ENCOUNTER — OFFICE VISIT (OUTPATIENT)
Dept: OBGYN CLINIC | Age: 50
End: 2020-11-24
Payer: COMMERCIAL

## 2020-11-24 VITALS
DIASTOLIC BLOOD PRESSURE: 70 MMHG | SYSTOLIC BLOOD PRESSURE: 110 MMHG | WEIGHT: 151 LBS | BODY MASS INDEX: 24.27 KG/M2 | HEIGHT: 66 IN

## 2020-11-24 PROCEDURE — 99396 PREV VISIT EST AGE 40-64: CPT | Performed by: OBSTETRICS & GYNECOLOGY

## 2020-11-24 RX ORDER — LEVOTHYROXINE AND LIOTHYRONINE 57; 13.5 UG/1; UG/1
90 TABLET ORAL DAILY
COMMUNITY
End: 2021-08-25

## 2020-11-24 NOTE — PATIENT INSTRUCTIONS
Please get your mammogram done as scheduled. We will contact you with those results as well as today's Pap smear. Return to the office in 1 year or as needed. Happy Thanksgiving, stay safe and have a healthy year!

## 2020-11-24 NOTE — PROGRESS NOTES
DATE OF VISIT:  20        History and Physical    Simona Baltazar    :  1970  CHIEF COMPLAINT:    Chief Complaint   Patient presents with    Annual Exam     Here for annual  Last pap 10/15/19 neg  Last mammogram 19 neg                    HPI :   Simona Baltazar is a 48 y.o. femaleGRAVIDA 6 PARA     Simona Baltazar returns today for her annual exam.  She has a history of an endometrial ablation for abnormal bleeding but was having monthly menses which were light with no significant cramping. Her last menses was in September. She believes she is having some infrequent hot flashes but no other vasomotor symptoms. She is having regular bowel movements without constipation or diarrhea. 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 She is otherwise without any significant complaints today. Brook Khan also has a history of hemochromatosis, was previously followed by hematology and told that she may no longer require routine phlebotomies.   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  _____________________________________________________________________  Past Medical History:   Diagnosis Date    Anemia     Asthma     Caffeine use     decaf coffee and 1 can pop/day    Frequency of urination     Hyperlipidemia     Hypothyroidism     Lactose intolerance     Menometrorrhagia     Menorrhagia     Miscarriage     Recurrent UTI     Urgency of urination                                                                    Past Surgical History:   Procedure Laterality Date     SECTION  ,     CYSTOSCOPY      DILATION AND CURETTAGE  2008    ENDOMETRIAL ABLATION  10/31/2010    due to menometrorrhagia    ENDOMETRIAL BIOPSY  2010    due to menorrhagia    OTHER SURGICAL HISTORY      as child- bladder outflow obstruction repain     Family History   Problem Relation Heavy/irregular periods           no                                      Vaginal discharge                   no  Hematological: Bruises easy   no     Endocrine:  Nipple Discharge  no     Hot/Cold Intolerance  no   Psychological:  Mood and affect were wnl yes                                                                                                                                           Physical Exam:    Vitals:    11/24/20 1300   BP: 110/70   Site: Right Upper Arm   Position: Sitting   Cuff Size: Medium Adult   Weight: 151 lb (68.5 kg)   Height: 5' 6\" (1.676 m)       General Appearance:  She does not appear to be in any distress. This  is a well developed, well nourished, well groomed female. Neurological:  The patient is alert and oriented to time, place, person, and situation without any noted sensory motor deficits. Skin:  A brief inspection of the skin revealed no rashes or lesions. Neck:  The neck was supple. There is no tracheal deviation, thyromegaly or supraclavicular adenopathy appreciated. Breast:   The patients breasts were symmetrical.  Breasts are nontender and there  were no masses, discharge or pathologic skin changes. There is no supraclavicular or axillary adenopathy bilaterally. Respiratory: There was unlabored respiratory effort. Lungs clear to ascultation without wheezes, rales or rhonchi in all fields bilaterally. Cardiovascular:  Normal sinus rhythm with a regular rate and without murmur, rubs or gallops. Abdomen: The abdomen was soft and non-tender with no guarding, rebound, CVAT or rigidity. No hernias were appreciated. Bowel sounds were normally active. Pelvic exam:  No vulvar, vaginal or cervical lesions are noted. Normal vaginal discharge present, no significant cystocele, rectocele or enterocele noted. Uterus nongravid and without CMT and adnexa nontender and without abnormal masses bilaterally.     Extremities:  FROM and nontender without clubbing cyanosis or edema. ASSESSMENT:         ICD-10-CM    1. Encounter for gynecological examination without abnormal finding  Z01.419 PAP SMEAR   2. Visit for screening mammogram  Z12.31 RITA DIGITAL SCREEN W OR WO CAD BILATERAL                   PLAN:    Return to the office in 1 year or when necessary  Patient was seen with total face to face time of 20 minutes. Elnor Schwab M.D., Mph  [facility]  [place]

## 2020-12-08 LAB — CYTOLOGY REPORT: NORMAL

## 2020-12-10 LAB
HPV SAMPLE: NORMAL
HPV, GENOTYPE 16: NOT DETECTED
HPV, GENOTYPE 18: NOT DETECTED
HPV, HIGH RISK OTHER: NOT DETECTED
HPV, INTERPRETATION: NORMAL
SPECIMEN DESCRIPTION: NORMAL

## 2020-12-14 ENCOUNTER — TELEPHONE (OUTPATIENT)
Dept: OBGYN CLINIC | Age: 50
End: 2020-12-14

## 2020-12-16 ENCOUNTER — HOSPITAL ENCOUNTER (OUTPATIENT)
Dept: MAMMOGRAPHY | Age: 50
Discharge: HOME | End: 2020-12-18
Payer: COMMERCIAL

## 2020-12-16 PROCEDURE — 77063 BREAST TOMOSYNTHESIS BI: CPT

## 2021-06-01 ENCOUNTER — HOSPITAL ENCOUNTER (OUTPATIENT)
Age: 51
Discharge: HOME | End: 2021-06-01
Payer: COMMERCIAL

## 2021-06-01 LAB
ABSOLUTE EOS #: 0.22 K/UL (ref 0–0.44)
ABSOLUTE IMMATURE GRANULOCYTE: <0.03 K/UL (ref 0–0.3)
ABSOLUTE LYMPH #: 1.68 K/UL (ref 1.1–3.7)
ABSOLUTE MONO #: 0.4 K/UL (ref 0.1–1.2)
ALBUMIN SERPL-MCNC: 4.3 G/DL (ref 3.5–5.2)
ALBUMIN/GLOBULIN RATIO: 1.9 (ref 1–2.5)
ALP BLD-CCNC: 53 U/L (ref 35–104)
ALT SERPL-CCNC: 15 U/L (ref 5–33)
ANION GAP SERPL CALCULATED.3IONS-SCNC: 12 MMOL/L (ref 9–17)
AST SERPL-CCNC: 16 U/L
BASOPHILS # BLD: 1 % (ref 0–2)
BASOPHILS ABSOLUTE: 0.04 K/UL (ref 0–0.2)
BILIRUB SERPL-MCNC: 0.91 MG/DL (ref 0.3–1.2)
BUN BLDV-MCNC: 13 MG/DL (ref 6–20)
BUN/CREAT BLD: NORMAL (ref 9–20)
CALCIUM SERPL-MCNC: 9.2 MG/DL (ref 8.6–10.4)
CHLORIDE BLD-SCNC: 106 MMOL/L (ref 98–107)
CO2: 24 MMOL/L (ref 20–31)
CREAT SERPL-MCNC: 0.62 MG/DL (ref 0.5–0.9)
DIFFERENTIAL TYPE: NORMAL
EOSINOPHILS RELATIVE PERCENT: 4 % (ref 1–4)
ESTIMATED AVERAGE GLUCOSE: 91 MG/DL
ESTRADIOL LEVEL: 269 PG/ML (ref 27–314)
GFR AFRICAN AMERICAN: >60 ML/MIN
GFR NON-AFRICAN AMERICAN: >60 ML/MIN
GFR SERPL CREATININE-BSD FRML MDRD: NORMAL ML/MIN/{1.73_M2}
GFR SERPL CREATININE-BSD FRML MDRD: NORMAL ML/MIN/{1.73_M2}
GLUCOSE FASTING: 81 MG/DL (ref 70–99)
HBA1C MFR BLD: 4.8 % (ref 4–6)
HCT VFR BLD CALC: 43.6 % (ref 36.3–47.1)
HEMOGLOBIN: 13.8 G/DL (ref 11.9–15.1)
IMMATURE GRANULOCYTES: 0 %
INSULIN COMMENT: NORMAL
INSULIN REFERENCE RANGE:: NORMAL
INSULIN: 10 MU/L
LYMPHOCYTES # BLD: 27 % (ref 24–43)
MCH RBC QN AUTO: 30.1 PG (ref 25.2–33.5)
MCHC RBC AUTO-ENTMCNC: 31.7 G/DL (ref 28.4–34.8)
MCV RBC AUTO: 95.2 FL (ref 82.6–102.9)
MONOCYTES # BLD: 7 % (ref 3–12)
NRBC AUTOMATED: 0 PER 100 WBC
PDW BLD-RTO: 12.3 % (ref 11.8–14.4)
PLATELET # BLD: 277 K/UL (ref 138–453)
PLATELET ESTIMATE: NORMAL
PMV BLD AUTO: 10.3 FL (ref 8.1–13.5)
POTASSIUM SERPL-SCNC: 4.3 MMOL/L (ref 3.7–5.3)
PROGESTERONE LEVEL: 0.9 NG/ML
RBC # BLD: 4.58 M/UL (ref 3.95–5.11)
RBC # BLD: NORMAL 10*6/UL
SEG NEUTROPHILS: 61 % (ref 36–65)
SEGMENTED NEUTROPHILS ABSOLUTE COUNT: 3.79 K/UL (ref 1.5–8.1)
SODIUM BLD-SCNC: 142 MMOL/L (ref 135–144)
T3 FREE: 2.34 PG/ML (ref 2.02–4.43)
TESTOSTERONE TOTAL: 34 NG/DL (ref 20–70)
THYROXINE, FREE: 0.97 NG/DL (ref 0.93–1.7)
TOTAL PROTEIN: 6.6 G/DL (ref 6.4–8.3)
TSH SERPL DL<=0.05 MIU/L-ACNC: 1.58 MIU/L (ref 0.3–5)
VITAMIN D 25-HYDROXY: 35.2 NG/ML (ref 30–100)
WBC # BLD: 6.2 K/UL (ref 3.5–11.3)
WBC # BLD: NORMAL 10*3/UL

## 2021-06-01 PROCEDURE — 84140 ASSAY OF PREGNENOLONE: CPT

## 2021-06-01 PROCEDURE — 84144 ASSAY OF PROGESTERONE: CPT

## 2021-06-01 PROCEDURE — 84403 ASSAY OF TOTAL TESTOSTERONE: CPT

## 2021-06-01 PROCEDURE — 85025 COMPLETE CBC W/AUTO DIFF WBC: CPT

## 2021-06-01 PROCEDURE — 83525 ASSAY OF INSULIN: CPT

## 2021-06-01 PROCEDURE — 83036 HEMOGLOBIN GLYCOSYLATED A1C: CPT

## 2021-06-01 PROCEDURE — 82627 DEHYDROEPIANDROSTERONE: CPT

## 2021-06-01 PROCEDURE — 84482 T3 REVERSE: CPT

## 2021-06-01 PROCEDURE — 84443 ASSAY THYROID STIM HORMONE: CPT

## 2021-06-01 PROCEDURE — 84439 ASSAY OF FREE THYROXINE: CPT

## 2021-06-01 PROCEDURE — 82306 VITAMIN D 25 HYDROXY: CPT

## 2021-06-01 PROCEDURE — 36415 COLL VENOUS BLD VENIPUNCTURE: CPT

## 2021-06-01 PROCEDURE — 82670 ASSAY OF TOTAL ESTRADIOL: CPT

## 2021-06-01 PROCEDURE — 80053 COMPREHEN METABOLIC PANEL: CPT

## 2021-06-01 PROCEDURE — 84481 FREE ASSAY (FT-3): CPT

## 2021-06-03 LAB — DHEAS (DHEA SULFATE): 89.9 UG/DL (ref 26–200)

## 2021-06-04 LAB — PREGNENOLONE: 108 NG/DL (ref 15–132)

## 2021-06-06 LAB — T3 REVERSE: 12.4 NG/DL (ref 9–27)

## 2021-08-10 ENCOUNTER — HOSPITAL ENCOUNTER (OUTPATIENT)
Age: 51
Discharge: HOME | End: 2021-08-10
Payer: COMMERCIAL

## 2021-08-10 LAB
T3 FREE: 2.11 PG/ML (ref 2.02–4.43)
THYROXINE, FREE: 0.63 NG/DL (ref 0.93–1.7)
TSH SERPL DL<=0.05 MIU/L-ACNC: 0.14 MIU/L (ref 0.3–5)

## 2021-08-10 PROCEDURE — 84439 ASSAY OF FREE THYROXINE: CPT

## 2021-08-10 PROCEDURE — 36415 COLL VENOUS BLD VENIPUNCTURE: CPT

## 2021-08-10 PROCEDURE — 84481 FREE ASSAY (FT-3): CPT

## 2021-08-10 PROCEDURE — 84443 ASSAY THYROID STIM HORMONE: CPT

## 2021-08-25 PROBLEM — N39.0 RECURRENT UTI: Status: ACTIVE | Noted: 2021-08-25

## 2021-11-03 ENCOUNTER — HOSPITAL ENCOUNTER (OUTPATIENT)
Age: 51
Discharge: HOME | End: 2021-11-03
Payer: COMMERCIAL

## 2021-11-03 LAB
ABSOLUTE EOS #: 0.17 K/UL (ref 0–0.44)
ABSOLUTE IMMATURE GRANULOCYTE: 0.03 K/UL (ref 0–0.3)
ABSOLUTE LYMPH #: 1.8 K/UL (ref 1.1–3.7)
ABSOLUTE MONO #: 0.48 K/UL (ref 0.1–1.2)
ALBUMIN SERPL-MCNC: 4 G/DL (ref 3.5–5.2)
ALBUMIN/GLOBULIN RATIO: 1.6 (ref 1–2.5)
ALP BLD-CCNC: 60 U/L (ref 35–104)
ALT SERPL-CCNC: 32 U/L (ref 5–33)
ANION GAP SERPL CALCULATED.3IONS-SCNC: 14 MMOL/L (ref 9–17)
AST SERPL-CCNC: 21 U/L
BASOPHILS # BLD: 0 % (ref 0–2)
BASOPHILS ABSOLUTE: <0.03 K/UL (ref 0–0.2)
BILIRUB SERPL-MCNC: 0.45 MG/DL (ref 0.3–1.2)
BUN BLDV-MCNC: 11 MG/DL (ref 6–20)
BUN/CREAT BLD: NORMAL (ref 9–20)
CALCIUM SERPL-MCNC: 9.2 MG/DL (ref 8.6–10.4)
CHLORIDE BLD-SCNC: 104 MMOL/L (ref 98–107)
CHOLESTEROL/HDL RATIO: 2.6
CHOLESTEROL: 209 MG/DL
CO2: 21 MMOL/L (ref 20–31)
CREAT SERPL-MCNC: 0.57 MG/DL (ref 0.5–0.9)
DIFFERENTIAL TYPE: NORMAL
EOSINOPHILS RELATIVE PERCENT: 2 % (ref 1–4)
FERRITIN: 76 UG/L (ref 13–150)
GFR AFRICAN AMERICAN: >60 ML/MIN
GFR NON-AFRICAN AMERICAN: >60 ML/MIN
GFR SERPL CREATININE-BSD FRML MDRD: NORMAL ML/MIN/{1.73_M2}
GFR SERPL CREATININE-BSD FRML MDRD: NORMAL ML/MIN/{1.73_M2}
GLUCOSE BLD-MCNC: 81 MG/DL (ref 70–99)
HCT VFR BLD CALC: 43.7 % (ref 36.3–47.1)
HDLC SERPL-MCNC: 79 MG/DL
HEMOGLOBIN: 14.3 G/DL (ref 11.9–15.1)
IMMATURE GRANULOCYTES: 0 %
IRON SATURATION: 29 % (ref 20–55)
IRON: 77 UG/DL (ref 37–145)
LDL CHOLESTEROL: 118 MG/DL (ref 0–130)
LYMPHOCYTES # BLD: 26 % (ref 24–43)
MCH RBC QN AUTO: 31 PG (ref 25.2–33.5)
MCHC RBC AUTO-ENTMCNC: 32.7 G/DL (ref 28.4–34.8)
MCV RBC AUTO: 94.6 FL (ref 82.6–102.9)
MONOCYTES # BLD: 7 % (ref 3–12)
NRBC AUTOMATED: 0 PER 100 WBC
PDW BLD-RTO: 11.9 % (ref 11.8–14.4)
PLATELET # BLD: 259 K/UL (ref 138–453)
PLATELET ESTIMATE: NORMAL
PMV BLD AUTO: 10.5 FL (ref 8.1–13.5)
POTASSIUM SERPL-SCNC: 4.4 MMOL/L (ref 3.7–5.3)
RBC # BLD: 4.62 M/UL (ref 3.95–5.11)
RBC # BLD: NORMAL 10*6/UL
SEG NEUTROPHILS: 65 % (ref 36–65)
SEGMENTED NEUTROPHILS ABSOLUTE COUNT: 4.53 K/UL (ref 1.5–8.1)
SODIUM BLD-SCNC: 139 MMOL/L (ref 135–144)
T3 FREE: 2.65 PG/ML (ref 2.02–4.43)
THYROXINE, FREE: 0.67 NG/DL (ref 0.93–1.7)
TOTAL IRON BINDING CAPACITY: 263 UG/DL (ref 250–450)
TOTAL PROTEIN: 6.5 G/DL (ref 6.4–8.3)
TRIGL SERPL-MCNC: 58 MG/DL
UNSATURATED IRON BINDING CAPACITY: 186 UG/DL (ref 112–347)
VLDLC SERPL CALC-MCNC: ABNORMAL MG/DL (ref 1–30)
WBC # BLD: 7 K/UL (ref 3.5–11.3)
WBC # BLD: NORMAL 10*3/UL

## 2021-11-03 PROCEDURE — 83540 ASSAY OF IRON: CPT

## 2021-11-03 PROCEDURE — 80061 LIPID PANEL: CPT

## 2021-11-03 PROCEDURE — 84439 ASSAY OF FREE THYROXINE: CPT

## 2021-11-03 PROCEDURE — 83550 IRON BINDING TEST: CPT

## 2021-11-03 PROCEDURE — 80053 COMPREHEN METABOLIC PANEL: CPT

## 2021-11-03 PROCEDURE — 82728 ASSAY OF FERRITIN: CPT

## 2021-11-03 PROCEDURE — 36415 COLL VENOUS BLD VENIPUNCTURE: CPT

## 2021-11-03 PROCEDURE — 84482 T3 REVERSE: CPT

## 2021-11-03 PROCEDURE — 85025 COMPLETE CBC W/AUTO DIFF WBC: CPT

## 2021-11-03 PROCEDURE — 84443 ASSAY THYROID STIM HORMONE: CPT

## 2021-11-03 PROCEDURE — 84481 FREE ASSAY (FT-3): CPT

## 2021-11-04 LAB — TSH SERPL DL<=0.05 MIU/L-ACNC: 0.02 MIU/L (ref 0.3–5)

## 2021-11-10 LAB — T3 REVERSE: 17.9 NG/DL (ref 9–27)

## 2022-01-27 ENCOUNTER — HOSPITAL ENCOUNTER (OUTPATIENT)
Age: 52
Discharge: HOME | End: 2022-01-27
Payer: COMMERCIAL

## 2022-01-27 PROCEDURE — 36415 COLL VENOUS BLD VENIPUNCTURE: CPT

## 2022-01-27 PROCEDURE — 84439 ASSAY OF FREE THYROXINE: CPT

## 2022-01-27 PROCEDURE — 84443 ASSAY THYROID STIM HORMONE: CPT

## 2022-01-27 PROCEDURE — 84481 FREE ASSAY (FT-3): CPT

## 2022-01-28 LAB
T3 FREE: 3.01 PG/ML (ref 2.02–4.43)
THYROXINE, FREE: 0.75 NG/DL (ref 0.93–1.7)
TSH SERPL DL<=0.05 MIU/L-ACNC: <0.01 MIU/L (ref 0.3–5)

## 2022-03-13 NOTE — PROGRESS NOTES
History and Physical     Fernando Carrasco    :  1970  CHIEF COMPLAINT:           Chief Complaint   Patient presents with    Annual Exam       Here for annual  Last pap  neg  Last mammogram 2020 neg                        HPI :   Fernando Carrasco is a 55y.o. femaleGRAVIDA 6 PARA      Fernando Carrasco returns today for her annual exam.  She has a history of an endometrial ablation for abnormal bleeding and has not had a menstrual cycle in 4 months. She does have some mild hot flashes but denies any night sweats or other vasomotor symptoms. She continues to have frequent UTIs and is followed by Dr. Edilberto Wade. She is having regular bowel movements without constipation or diarrhea. 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 She is otherwise without any significant complaints today. 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 She has completed her Covid vaccinations. Bouchra Crawford unfortunately lost her father unexpectedly in January. She states that she has excellent family support and feels that she is coping appropriately.   _____________________________________________________________________  Past Medical History        Past Medical History:   Diagnosis Date    Anemia      Asthma      Caffeine use       decaf coffee and 1 can pop/day    Frequency of urination      Hyperlipidemia      Hypothyroidism      Lactose intolerance      Menometrorrhagia      Menorrhagia      Miscarriage      Recurrent UTI      Urgency of urination                                                                        Past Surgical History         Past Surgical History:   Procedure Laterality Date     SECTION   , 2006    CYSTOSCOPY        DILATION AND CURETTAGE   2008    ENDOMETRIAL ABLATION   10/31/2010     due to menometrorrhagia    ENDOMETRIAL BIOPSY   2010     due to menorrhagia    OTHER SURGICAL HISTORY         as child- bladder outflow obstruction repain         Family History         Family History   Problem Relation Age of Onset    Other Paternal Grandfather           hd, hbp, aneurysm    Other Paternal Grandmother           hd, hbp, cva    Other Maternal Grandmother           [facility] Other Mother           abdominal anyersum         Social History          Tobacco Use   Smoking Status Former Smoker   Smokeless Tobacco Never Used      Social History           Substance and Sexual Activity   Alcohol Use Yes    Frequency: 2-3 times a week      Current Facility-Administered Medications          Current Outpatient Medications   Medication Sig Dispense Refill    thyroid (ARMOUR THYROID) 90 MG tablet Take 90 mg by mouth daily        vitamin D (CHOLECALCIFEROL) 1000 UNIT TABS tablet Take 1,000 Units by mouth daily        vitamin B-12 (CYANOCOBALAMIN) 500 MCG tablet Take 500 mcg by mouth daily        albuterol sulfate  (90 Base) MCG/ACT inhaler Inhale 2 puffs into the lungs every 6 hours as needed for Wheezing 1 Inhaler 0    sulfamethoxazole-trimethoprim (BACTRIM DS) 800-160 MG per tablet Take 1 tablet by mouth daily Take before and after sexual intercourse to prevent UTI (Patient not taking: Reported on 2020) 30 tablet 11      No current facility-administered medications for this visit.             Allergies:  No Known Allergies     Gynecologic History:  Patient's last menstrual period was 2020.   Sexually Active: Yes  STD History: No  Abnormal Pap History no  Birth Control: Yes, vasectomy              OB History    Para Term  AB Living   6 2 2 0 4 2   SAB TAB Ectopic Molar Multiple Live Births    4 0 0 0 0 0      ______________________________________________________________________  REVIEW OF SYSTEMS:        Constitutional:             Unexpected weight change    no  Neurological:               Frequent headaches               no  Ophthalmic: Recent visual changes           no  ENT:                            Difficulty swallowing                no  Breast:                         Masses                                   no                                  Respiratory:                 Shortness of breath                no                      Cardiovascular:           Chest pain                               no                                  Gastrointestinal:          Chronic diarrhea/constipation no          Urogenital:                   Urinary incontinence               no                                                           Heavy/irregular periods           no                                      Vaginal discharge                   no  Hematological:            Bruises easy                           no                                  Endocrine:                   Nipple Discharge                    no                                      Hot/Cold Intolerance               no   Psychological:            Mood and affect were wnl       yes                                                                                                                                            Physical Exam:    Vitals:    03/14/22 0933   BP: 110/62          General Appearance:  She does not appear to be in any distress. This  is a well developed, well nourished, well groomed female.         Neurological:  The patient is alert and oriented to time, place, person, and situation without any noted sensory motor deficits.     Skin:  A brief inspection of the skin revealed no rashes or lesions. Neck:  The neck was supple. There is no tracheal deviation, thyromegaly or supraclavicular adenopathy appreciated.     Breast:   The patients breasts were symmetrical.   Breasts are nontender and there  were no masses, discharge or pathologic skin changes. There is no supraclavicular or axillary adenopathy bilaterally.     Respiratory:   There was unlabored respiratory effort. Lungs clear to ascultation without wheezes, rales or rhonchi in all fields bilaterally.     Cardiovascular:  Normal sinus rhythm with a regular rate and without murmur, rubs or gallops.     Abdomen: The abdomen was soft and non-tender with no guarding, rebound, CVAT or rigidity. No hernias were appreciated. Bowel sounds were normally active.     Pelvic exam:  No vulvar, vaginal or cervical lesions are noted. Normal vaginal discharge present, no significant cystocele, rectocele or enterocele noted. Uterus nongravid and without CMT and adnexa nontender and without abnormal masses bilaterally.     Extremities:  FROM and nontender without clubbing cyanosis or edema. ASSESSMENT:       Diagnosis Orders   1. Encounter for gynecological examination without abnormal finding  PAP SMEAR   2. Visit for screening mammogram  RITA DIGITAL SCREEN W OR WO CAD BILATERAL                                                                                          PLAN:     Return to the office in 1 year or when necessary  Arely Talbot M.D., Mph  [facility]  [place]

## 2022-03-14 ENCOUNTER — OFFICE VISIT (OUTPATIENT)
Dept: OBGYN CLINIC | Age: 52
End: 2022-03-14
Payer: COMMERCIAL

## 2022-03-14 VITALS
WEIGHT: 153.8 LBS | DIASTOLIC BLOOD PRESSURE: 62 MMHG | HEIGHT: 66 IN | BODY MASS INDEX: 24.72 KG/M2 | SYSTOLIC BLOOD PRESSURE: 110 MMHG

## 2022-03-14 DIAGNOSIS — Z12.31 VISIT FOR SCREENING MAMMOGRAM: ICD-10-CM

## 2022-03-14 DIAGNOSIS — Z01.419 ENCOUNTER FOR GYNECOLOGICAL EXAMINATION WITHOUT ABNORMAL FINDING: Primary | ICD-10-CM

## 2022-03-14 PROCEDURE — 99396 PREV VISIT EST AGE 40-64: CPT | Performed by: OBSTETRICS & GYNECOLOGY

## 2022-03-14 ASSESSMENT — PATIENT HEALTH QUESTIONNAIRE - PHQ9
1. LITTLE INTEREST OR PLEASURE IN DOING THINGS: 1
2. FEELING DOWN, DEPRESSED OR HOPELESS: 1
SUM OF ALL RESPONSES TO PHQ QUESTIONS 1-9: 2
SUM OF ALL RESPONSES TO PHQ9 QUESTIONS 1 & 2: 2

## 2022-03-14 NOTE — PATIENT INSTRUCTIONS
Please get your mammogram done as scheduled. We will contact you with that result as well as today's Pap. Return to the office in 1 year or as needed. Good luck and have a safe and healthy 2022!

## 2022-03-24 DIAGNOSIS — Z01.419 ENCOUNTER FOR GYNECOLOGICAL EXAMINATION WITHOUT ABNORMAL FINDING: ICD-10-CM

## 2022-04-29 ENCOUNTER — HOSPITAL ENCOUNTER (OUTPATIENT)
Dept: MAMMOGRAPHY | Age: 52
Discharge: HOME | End: 2022-05-01
Payer: COMMERCIAL

## 2022-04-29 DIAGNOSIS — Z12.31 VISIT FOR SCREENING MAMMOGRAM: ICD-10-CM

## 2022-04-29 PROCEDURE — 77063 BREAST TOMOSYNTHESIS BI: CPT

## 2022-09-08 PROBLEM — N95.2 POST-MENOPAUSAL ATROPHIC VAGINITIS: Status: ACTIVE | Noted: 2022-09-08

## 2023-01-24 ENCOUNTER — HOSPITAL ENCOUNTER (OUTPATIENT)
Age: 53
Discharge: HOME | End: 2023-01-24
Payer: COMMERCIAL

## 2023-01-24 LAB
T3 FREE: 5.5 PG/ML (ref 2.02–4.43)
THYROXINE, FREE: 0.89 NG/DL (ref 0.93–1.7)
TSH SERPL DL<=0.05 MIU/L-ACNC: <0.01 UIU/ML (ref 0.3–5)

## 2023-01-24 PROCEDURE — 84481 FREE ASSAY (FT-3): CPT

## 2023-01-24 PROCEDURE — 36415 COLL VENOUS BLD VENIPUNCTURE: CPT

## 2023-01-24 PROCEDURE — 84439 ASSAY OF FREE THYROXINE: CPT

## 2023-01-24 PROCEDURE — 84443 ASSAY THYROID STIM HORMONE: CPT

## 2023-02-07 ENCOUNTER — HOSPITAL ENCOUNTER (OUTPATIENT)
Dept: GENERAL RADIOLOGY | Age: 53
Discharge: HOME | End: 2023-02-09
Payer: COMMERCIAL

## 2023-02-07 ENCOUNTER — HOSPITAL ENCOUNTER (OUTPATIENT)
Age: 53
Discharge: HOME | End: 2023-02-09
Payer: COMMERCIAL

## 2023-02-07 DIAGNOSIS — G89.29 OTHER CHRONIC PAIN: ICD-10-CM

## 2023-02-07 DIAGNOSIS — M54.2 NECK PAIN: ICD-10-CM

## 2023-02-07 DIAGNOSIS — M25.511 RIGHT SHOULDER PAIN, UNSPECIFIED CHRONICITY: ICD-10-CM

## 2023-02-07 PROCEDURE — 72050 X-RAY EXAM NECK SPINE 4/5VWS: CPT

## 2023-02-07 PROCEDURE — 73030 X-RAY EXAM OF SHOULDER: CPT

## 2023-03-01 ENCOUNTER — HOSPITAL ENCOUNTER (OUTPATIENT)
Dept: MRI IMAGING | Age: 53
Discharge: HOME | End: 2023-03-03
Payer: COMMERCIAL

## 2023-03-01 DIAGNOSIS — M54.12 CERVICAL RADICULOPATHY: ICD-10-CM

## 2023-03-01 PROCEDURE — 72141 MRI NECK SPINE W/O DYE: CPT

## 2023-04-08 ENCOUNTER — HOSPITAL ENCOUNTER (OUTPATIENT)
Age: 53
Discharge: HOME | End: 2023-04-08
Payer: COMMERCIAL

## 2023-04-08 LAB
T3FREE SERPL-MCNC: 1.82 PG/ML (ref 2.02–4.43)
T4 FREE SERPL-MCNC: 0.55 NG/DL (ref 0.93–1.7)
TSH SERPL-ACNC: 0.01 UIU/ML (ref 0.3–5)

## 2023-04-08 PROCEDURE — 84481 FREE ASSAY (FT-3): CPT

## 2023-04-08 PROCEDURE — 84439 ASSAY OF FREE THYROXINE: CPT

## 2023-04-08 PROCEDURE — 36415 COLL VENOUS BLD VENIPUNCTURE: CPT

## 2023-04-08 PROCEDURE — 84443 ASSAY THYROID STIM HORMONE: CPT

## 2023-07-11 ENCOUNTER — OFFICE VISIT (OUTPATIENT)
Dept: OBGYN CLINIC | Age: 53
End: 2023-07-11
Payer: COMMERCIAL

## 2023-07-11 VITALS
HEIGHT: 66 IN | BODY MASS INDEX: 24.43 KG/M2 | DIASTOLIC BLOOD PRESSURE: 83 MMHG | HEART RATE: 67 BPM | WEIGHT: 152 LBS | SYSTOLIC BLOOD PRESSURE: 120 MMHG

## 2023-07-11 DIAGNOSIS — Z01.419 ENCOUNTER FOR WELL WOMAN EXAM: Primary | ICD-10-CM

## 2023-07-11 DIAGNOSIS — Z12.31 ENCOUNTER FOR SCREENING MAMMOGRAM FOR BREAST CANCER: ICD-10-CM

## 2023-07-11 PROBLEM — J45.30 MILD PERSISTENT ASTHMA WITHOUT COMPLICATION: Status: ACTIVE | Noted: 2023-07-11

## 2023-07-11 PROBLEM — K59.00 CONSTIPATION: Status: ACTIVE | Noted: 2023-07-11

## 2023-07-11 PROBLEM — M54.30 SCIATICA: Status: ACTIVE | Noted: 2023-07-11

## 2023-07-11 PROCEDURE — 99396 PREV VISIT EST AGE 40-64: CPT | Performed by: OBSTETRICS & GYNECOLOGY

## 2023-07-11 RX ORDER — TRIAMCINOLONE ACETONIDE 1 MG/G
CREAM TOPICAL
COMMUNITY
Start: 2023-04-11 | End: 2023-07-11 | Stop reason: ALTCHOICE

## 2023-07-11 RX ORDER — VALACYCLOVIR HYDROCHLORIDE 1 G/1
TABLET, FILM COATED ORAL
COMMUNITY
Start: 2023-04-11

## 2023-07-11 ASSESSMENT — PATIENT HEALTH QUESTIONNAIRE - PHQ9
SUM OF ALL RESPONSES TO PHQ9 QUESTIONS 1 & 2: 0
SUM OF ALL RESPONSES TO PHQ QUESTIONS 1-9: 0
2. FEELING DOWN, DEPRESSED OR HOPELESS: 0
SUM OF ALL RESPONSES TO PHQ QUESTIONS 1-9: 0
1. LITTLE INTEREST OR PLEASURE IN DOING THINGS: 0

## 2023-07-11 NOTE — PATIENT INSTRUCTIONS
Please get your mammogram done at your convenience and we will contact you with that result. Your last mammogram was in March of last year. Plan on returning to the office in 1 year or as needed. Have a great summer and a fantastic year!

## 2023-07-11 NOTE — PROGRESS NOTES
[facility]asten  [address] [address]       DATE OF VISIT:  23        History and Physical    Selwyn Hendrix    :  1970  CHIEF COMPLAINT:    Chief Complaint   Patient presents with    Annual Exam     [employer] ins.   p 3.24.22 neg m STA 4.29.22 birads1                    HPI :   Selwyn Hendrix is a 46 y.o. femaleGRAVIDA 6 PARA    PCP: Terri Cruz returns today for her annual exam.  Her fatigue level is increased as they are having difficulty managing her thyroid disorder. She does have some lab work pending. She is also following with Dr. Talha Bonilla for colon for her frequent UTIs and is still using Bactrim DS prophylactically and briefly tried estrogen vaginal cream but discontinued due to hot flashes. She is having regular bowel movements without constipation or diarrhea. She denies any involuntary loss of urine. She is otherwise without any significant complaints today.   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  _____________________________________________________________________  Past Medical History:   Diagnosis Date    Anemia     Asthma     Caffeine use     decaf coffee and 1 can pop/day    Frequency of urination     Hyperlipidemia     Hypothyroidism     Lactose intolerance     Menometrorrhagia     Menorrhagia     Miscarriage     Recurrent UTI     Urgency of urination                                                                    Past Surgical History:   Procedure Laterality Date     SECTION  2004, 2006    CYSTOSCOPY      DILATION AND CURETTAGE  2008    ENDOMETRIAL ABLATION  10/31/2010    due to menometrorrhagia    ENDOMETRIAL BIOPSY  2010    due to menorrhagia    OTHER SURGICAL HISTORY      as child- bladder outflow obstruction repain     Family History   Problem Relation Age of Onset    Other Paternal Grandfather         hd, hbp,

## 2023-07-31 ENCOUNTER — HOSPITAL ENCOUNTER (OUTPATIENT)
Age: 53
Discharge: HOME | End: 2023-07-31
Payer: COMMERCIAL

## 2023-07-31 LAB
ESTRADIOL LEVEL: 59.8 PG/ML (ref 27–314)
PROGEST SERPL-MCNC: 0.32 NG/ML
T3FREE SERPL-MCNC: 2.41 PG/ML (ref 2.02–4.43)
T4 FREE SERPL-MCNC: 0.8 NG/DL (ref 0.9–1.7)
TESTOST SERPL-MCNC: 40 NG/DL (ref 20–70)
TSH SERPL DL<=0.05 MIU/L-ACNC: <0.01 UIU/ML (ref 0.3–5)

## 2023-07-31 PROCEDURE — 84144 ASSAY OF PROGESTERONE: CPT

## 2023-07-31 PROCEDURE — 84481 FREE ASSAY (FT-3): CPT

## 2023-07-31 PROCEDURE — 36415 COLL VENOUS BLD VENIPUNCTURE: CPT

## 2023-07-31 PROCEDURE — 84443 ASSAY THYROID STIM HORMONE: CPT

## 2023-07-31 PROCEDURE — 84403 ASSAY OF TOTAL TESTOSTERONE: CPT

## 2023-07-31 PROCEDURE — 82670 ASSAY OF TOTAL ESTRADIOL: CPT

## 2023-07-31 PROCEDURE — 84439 ASSAY OF FREE THYROXINE: CPT

## 2023-07-31 PROCEDURE — 82627 DEHYDROEPIANDROSTERONE: CPT

## 2023-08-03 LAB — DHEA-S SERPL-MCNC: 102 UG/DL (ref 26–200)

## 2023-08-14 ENCOUNTER — HOSPITAL ENCOUNTER (OUTPATIENT)
Dept: MAMMOGRAPHY | Age: 53
Discharge: HOME | End: 2023-08-16
Attending: OBSTETRICS & GYNECOLOGY
Payer: COMMERCIAL

## 2023-08-14 DIAGNOSIS — Z12.31 ENCOUNTER FOR SCREENING MAMMOGRAM FOR BREAST CANCER: ICD-10-CM

## 2023-08-14 PROCEDURE — 77063 BREAST TOMOSYNTHESIS BI: CPT

## 2023-09-28 ENCOUNTER — OFFICE VISIT (OUTPATIENT)
Age: 53
End: 2023-09-28
Payer: COMMERCIAL

## 2023-09-28 VITALS — WEIGHT: 152 LBS | BODY MASS INDEX: 24.43 KG/M2 | HEIGHT: 66 IN

## 2023-09-28 DIAGNOSIS — N39.0 RECURRENT UTI: Primary | ICD-10-CM

## 2023-09-28 PROCEDURE — 99213 OFFICE O/P EST LOW 20 MIN: CPT | Performed by: SPECIALIST

## 2023-09-28 RX ORDER — SULFAMETHOXAZOLE AND TRIMETHOPRIM 800; 160 MG/1; MG/1
1 TABLET ORAL DAILY
Qty: 30 TABLET | Refills: 11 | Status: SHIPPED | OUTPATIENT
Start: 2023-09-28

## 2024-02-01 ENCOUNTER — HOSPITAL ENCOUNTER (OUTPATIENT)
Age: 54
Discharge: HOME | End: 2024-02-01
Payer: COMMERCIAL

## 2024-02-01 LAB
25(OH)D3 SERPL-MCNC: 53.8 NG/ML (ref 30–100)
DHEA-S SERPL-MCNC: 106 UG/DL (ref 35.4–256)
ESTRADIOL LEVEL: <5 PG/ML
FSH SERPL-ACNC: 133 MIU/ML
PROGEST SERPL-MCNC: 0.49 NG/ML
T3FREE SERPL-MCNC: 3.87 PG/ML (ref 2.02–4.43)
T4 FREE SERPL-MCNC: 1 NG/DL (ref 0.9–1.7)
TSH SERPL DL<=0.05 MIU/L-ACNC: <0.01 UIU/ML (ref 0.3–5)

## 2024-02-01 PROCEDURE — 84403 ASSAY OF TOTAL TESTOSTERONE: CPT

## 2024-02-01 PROCEDURE — 83001 ASSAY OF GONADOTROPIN (FSH): CPT

## 2024-02-01 PROCEDURE — 82642 DIHYDROTESTOSTERONE: CPT

## 2024-02-01 PROCEDURE — 84443 ASSAY THYROID STIM HORMONE: CPT

## 2024-02-01 PROCEDURE — 36415 COLL VENOUS BLD VENIPUNCTURE: CPT

## 2024-02-01 PROCEDURE — 82670 ASSAY OF TOTAL ESTRADIOL: CPT

## 2024-02-01 PROCEDURE — 84481 FREE ASSAY (FT-3): CPT

## 2024-02-01 PROCEDURE — 82306 VITAMIN D 25 HYDROXY: CPT

## 2024-02-01 PROCEDURE — 84144 ASSAY OF PROGESTERONE: CPT

## 2024-02-01 PROCEDURE — 82627 DEHYDROEPIANDROSTERONE: CPT

## 2024-02-01 PROCEDURE — 84482 T3 REVERSE: CPT

## 2024-02-01 PROCEDURE — 84439 ASSAY OF FREE THYROXINE: CPT

## 2024-02-01 PROCEDURE — 84270 ASSAY OF SEX HORMONE GLOBUL: CPT

## 2024-02-02 LAB
SHBG SERPL-SCNC: 210 NMOL/L (ref 30–135)
TESTOST FREE MFR SERPL: ABNORMAL PG/ML (ref 0.6–3.8)
TESTOST SERPL-MCNC: 50 NG/DL (ref 20–70)

## 2024-02-04 LAB — DIHYDROTESTOSTERONE: 245.3 PG/ML (ref 24–208)

## 2024-02-06 LAB — T3 REVERSE: 13.2 NG/DL (ref 9–27)

## 2024-02-09 LAB — THYROID STIMULATING IMMUNOGLOB: <0.1 IU/L

## 2024-06-05 ENCOUNTER — HOSPITAL ENCOUNTER (EMERGENCY)
Age: 54
Discharge: HOME | End: 2024-06-05
Attending: EMERGENCY MEDICINE
Payer: COMMERCIAL

## 2024-06-05 VITALS
HEART RATE: 74 BPM | DIASTOLIC BLOOD PRESSURE: 83 MMHG | OXYGEN SATURATION: 97 % | WEIGHT: 162 LBS | TEMPERATURE: 98.5 F | BODY MASS INDEX: 26.03 KG/M2 | HEIGHT: 66 IN | RESPIRATION RATE: 18 BRPM | SYSTOLIC BLOOD PRESSURE: 148 MMHG

## 2024-06-05 DIAGNOSIS — S61.209A AVULSION OF FINGERTIP, INITIAL ENCOUNTER: Primary | ICD-10-CM

## 2024-06-05 PROCEDURE — 12001 RPR S/N/AX/GEN/TRNK 2.5CM/<: CPT

## 2024-06-05 PROCEDURE — 6360000002 HC RX W HCPCS

## 2024-06-05 PROCEDURE — 90471 IMMUNIZATION ADMIN: CPT

## 2024-06-05 PROCEDURE — 99284 EMERGENCY DEPT VISIT MOD MDM: CPT

## 2024-06-05 PROCEDURE — 2500000003 HC RX 250 WO HCPCS

## 2024-06-05 PROCEDURE — 90715 TDAP VACCINE 7 YRS/> IM: CPT

## 2024-06-05 PROCEDURE — 6370000000 HC RX 637 (ALT 250 FOR IP)

## 2024-06-05 RX ORDER — LIDOCAINE HYDROCHLORIDE 10 MG/ML
5 INJECTION, SOLUTION INFILTRATION; PERINEURAL ONCE
Status: COMPLETED | OUTPATIENT
Start: 2024-06-05 | End: 2024-06-05

## 2024-06-05 RX ORDER — CEPHALEXIN 250 MG/1
500 CAPSULE ORAL 2 TIMES DAILY
Qty: 20 CAPSULE | Refills: 0 | Status: SHIPPED | OUTPATIENT
Start: 2024-06-05 | End: 2024-06-10

## 2024-06-05 RX ORDER — CEPHALEXIN 250 MG/1
500 CAPSULE ORAL ONCE
Status: COMPLETED | OUTPATIENT
Start: 2024-06-05 | End: 2024-06-05

## 2024-06-05 RX ADMIN — CEPHALEXIN 500 MG: 250 CAPSULE ORAL at 13:39

## 2024-06-05 RX ADMIN — LIDOCAINE HYDROCHLORIDE 5 ML: 10 INJECTION, SOLUTION INFILTRATION; PERINEURAL at 13:39

## 2024-06-05 RX ADMIN — TETANUS TOXOID, REDUCED DIPHTHERIA TOXOID AND ACELLULAR PERTUSSIS VACCINE, ADSORBED 0.5 ML: 5; 2.5; 8; 8; 2.5 SUSPENSION INTRAMUSCULAR at 13:37

## 2024-06-05 ASSESSMENT — PAIN - FUNCTIONAL ASSESSMENT: PAIN_FUNCTIONAL_ASSESSMENT: 0-10

## 2024-06-05 ASSESSMENT — PAIN SCALES - GENERAL: PAINLEVEL_OUTOF10: 2

## 2024-06-05 NOTE — DISCHARGE INSTRUCTIONS
Call today or tomorrow to follow up with North Perkins MD  in 5-7 days.    Use ibuprofen or Tylenol (unless prescribed medications that have Tylenol in it) for pain.  You can take over the counter Ibuprofen (advil) tablets (4 tablets every 8 hours or 3 tablets every 6 hours or 2 tablets every 4 hours)    You can shower with the laceration, would avoid baths or swimming in lakes / rivers.  DO NOT apply bacitracin / triple antibiotic ointment / Neosporin / Vaseline to the wound.    The glue will fall off in 5 - 7 days.  After that you can apply sunscreen to the healing wound when outside to help with scarring.    Return to the emergency department for worsening of pain, fever > 101.5, redness around the wound or redness streaking up the body part, white drainage from wound.

## 2024-06-05 NOTE — ED PROVIDER NOTES
[facility] EMERGENCY DEPARTMENT  Emergency Department Encounter  Mid Level Provider     Pt Name: Aminah Michaels  MRN: [id number]  Birthdate 1970  Date of evaluation: 6/5/24  PCP:  North Perkins MD    CHIEF COMPLAINT       Chief Complaint   Patient presents with    Finger Laceration     Patient was trimming her bushes today with an electric miguel and cut her left hand middle finger on the tip. Bleeding is controlled at this time. Patient does not know when she had her last tetanus shot.        HISTORY OF PRESENT ILLNESS  (Location/Symptom, Timing/Onset,Context/Setting, Quality, Duration, Modifying Factors, Severity.)      Aminah Michaels is a 53 y.o. female who presents with complaints of fingertip injury of the left fourth digit.  She was using an electric miguel and got caught on some of her hedges jumped forward and she went to stop it from moving and used her hand fingertip was then caught in between the blades as it was running.  Patient reports it bled for about 30 minutes at home she tried to get the bleeding to stop however she could not.  She presents here for further evaluation.  Upon initial evaluation bleeding is controlled there is a flap of skin on the most distal end of the tip of the finger bleeding is controlled at this time does not appear to be any damage to the nail or the nail bed.  She denies any other injuries.  She denies numbness, tingling, loss of sensation, loss of range of motion.  She does not believe that she is up-to-date on her tetanus vaccine and I have no records of it within the past 10 years so we will update.    PAST MEDICAL /SURGICAL / SOCIAL / FAMILY HISTORY      has a past medical history of Anemia, Asthma, Caffeine use, Frequency of urination, Hyperlipidemia, Hypothyroidism, Lactose intolerance, Menometrorrhagia, Menorrhagia, Miscarriage, Recurrent UTI, and Urgency of urination.     has a past surgical history that includes Endometrial biopsy

## 2024-06-10 NOTE — ED PROVIDER NOTES
[facility] EMERGENCY DEPARTMENT  eMERGENCY dEPARTMENT eNCOUnter   Independent Attestation     Pt Name: Aminah Michaels  MRN: [id number]  Birthdate 1970  Date of evaluation: 6/10/24       Aminah Michaels is a 53 y.o. female who presents with Finger Laceration (Patient was trimming her bushes today with an electric miguel and cut her left hand middle finger on the tip. Bleeding is controlled at this time. Patient does not know when she had her last tetanus shot. )        Based on the medical record, the care appears appropriate. I was personally available for consultation in the Emergency Department.    Lakeisha Welch MD  Attending Emergency  Physician                Lakeisha Welch MD  06/10/24 2053

## 2024-10-03 ENCOUNTER — HOSPITAL ENCOUNTER (OUTPATIENT)
Age: 54
Setting detail: SPECIMEN
Discharge: HOME | End: 2024-10-03

## 2024-10-03 ENCOUNTER — OFFICE VISIT (OUTPATIENT)
Dept: OBGYN CLINIC | Age: 54
End: 2024-10-03
Payer: COMMERCIAL

## 2024-10-03 VITALS
HEIGHT: 66 IN | WEIGHT: 161 LBS | BODY MASS INDEX: 25.88 KG/M2 | DIASTOLIC BLOOD PRESSURE: 72 MMHG | SYSTOLIC BLOOD PRESSURE: 122 MMHG

## 2024-10-03 DIAGNOSIS — Z12.31 ENCOUNTER FOR SCREENING MAMMOGRAM FOR BREAST CANCER: ICD-10-CM

## 2024-10-03 DIAGNOSIS — Z01.419 ENCOUNTER FOR GYNECOLOGICAL EXAMINATION WITHOUT ABNORMAL FINDING: Primary | ICD-10-CM

## 2024-10-03 PROCEDURE — 99459 PELVIC EXAMINATION: CPT | Performed by: OBSTETRICS & GYNECOLOGY

## 2024-10-03 PROCEDURE — 99396 PREV VISIT EST AGE 40-64: CPT | Performed by: OBSTETRICS & GYNECOLOGY

## 2024-10-03 ASSESSMENT — PATIENT HEALTH QUESTIONNAIRE - PHQ9
SUM OF ALL RESPONSES TO PHQ9 QUESTIONS 1 & 2: 2
1. LITTLE INTEREST OR PLEASURE IN DOING THINGS: SEVERAL DAYS
SUM OF ALL RESPONSES TO PHQ QUESTIONS 1-9: 2
2. FEELING DOWN, DEPRESSED OR HOPELESS: SEVERAL DAYS

## 2024-10-03 NOTE — PATIENT INSTRUCTIONS
Please get your mammogram done at your convenience.  We will let you know that result as well as today's Pap smear.  Plan on returning to the office in 1 year.  Hang in there and have a fantastic year!

## 2024-10-03 NOTE — PROGRESS NOTES
[facility], [facility]  [address]  [address]  [place] [address]       DATE OF VISIT:  10/3/24        History and Physical    Aminah Michaels    :  1970  CHIEF COMPLAINT:   Chief Complaint   Patient presents with    Annual Exam     Here for annual last pap 20 neg last mammogram 23 neg                         HPI :   Aminah Michaels is a 52 y.o. femaleGRAVIDA 6 PARA    PCP: [place] [facility]     Aminah returns today for her annual exam.  She is also due for her mammogram.  States her thyroid was borderline about 6 months ago and it is being managed by a .  She was started on HRT 0.1 mg estradiol and progesterone but has been taking it sporadically.  She is having hot flashes and night sweats but denies any vaginal bleeding.  She is having regular bowel movements without constipation or diarrhea.  She denies any involuntary loss of urine.  She is otherwise without any significant complaints today.  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  Unfortunately she lost both her parents within 14 months and was having some difficulty with bereavement.  States that she is doing much better but does have several days where she feels down and depressed.  _____________________________________________________________________  Past Medical History:   Diagnosis Date    Anemia     Asthma     Caffeine use     decaf coffee and 1 can pop/day    Frequency of urination     Hyperlipidemia     Hypothyroidism     Lactose intolerance     Menometrorrhagia     Menorrhagia     Miscarriage     Recurrent UTI     Urgency of urination                                                                    Past Surgical History:   Procedure Laterality Date     SECTION  , 2006    CYSTOSCOPY      DILATION AND CURETTAGE  2008    ENDOMETRIAL ABLATION  10/31/2010    due to menometrorrhagia    ENDOMETRIAL BIOPSY  2010

## 2024-10-10 ENCOUNTER — OFFICE VISIT (OUTPATIENT)
Age: 54
End: 2024-10-10
Payer: COMMERCIAL

## 2024-10-10 VITALS — BODY MASS INDEX: 25.88 KG/M2 | WEIGHT: 161 LBS | HEIGHT: 66 IN

## 2024-10-10 DIAGNOSIS — N39.0 RECURRENT UTI: Primary | ICD-10-CM

## 2024-10-10 LAB
BILIRUBIN, POC: NORMAL
BLOOD URINE, POC: NORMAL
CLARITY, POC: CLEAR
COLOR, POC: YELLOW
GLUCOSE URINE, POC: NORMAL MG/DL
KETONES, POC: NORMAL
LEUKOCYTE EST, POC: NORMAL
NITRITE, POC: NORMAL
PH, POC: NORMAL
PROTEIN, POC: NORMAL MG/DL
SPECIFIC GRAVITY, POC: NORMAL
UROBILINOGEN, POC: NORMAL

## 2024-10-10 PROCEDURE — 99213 OFFICE O/P EST LOW 20 MIN: CPT | Performed by: SPECIALIST

## 2024-10-10 PROCEDURE — 81003 URINALYSIS AUTO W/O SCOPE: CPT | Performed by: SPECIALIST

## 2024-10-10 RX ORDER — SULFAMETHOXAZOLE/TRIMETHOPRIM 800-160 MG
1 TABLET ORAL DAILY PRN
Qty: 30 TABLET | Refills: 2 | Status: SHIPPED | OUTPATIENT
Start: 2024-10-10

## 2024-10-10 NOTE — PROGRESS NOTES
Sean Michaels MD [facility] Urology Office Progress Note    Patient:  Aminah Michaels  YOB: 1970  Date: 10/10/2024    HISTORY OF PRESENT ILLNESS:   The patient is a 53 y.o. female  Patient doing well using  Bactrim before and after sexual intercourse to prevent recurrent UTI's.  Urine for C&S each and every time patient thinks she has a UTI.     Lower urinary tract symptoms: urgency   Last AUA Symptom Score (QOL): 4 (2)  Today's AUA Symptom Score (QOL): 1 (1)    Summary of old records:   Urgency, frequency after sex: 3/20/19 PVR = 0 mL, Bactrim on hand/before&after sex, Myrbetriq 25 mg prn, C&S each time, previous 2010 cysto neg, h/o Cranberry/D-mannose qd; Estrace vaginal cream-stopped due to side effect  6/5/19 VD: TFC=141 mL, QZJ=3375 mL/d, TV=6.33 voids/d, HNF=363 mL/void, Nx0.33, NPi=19.72%, NUP=37 mL/hr   12/21/18, 6/2/19 Kleb pneumo UTI  Rare VAHID    Additional History: none    Procedures Today: N/A    Urinalysis today:  Results for orders placed or performed in visit on 10/10/24   POCT Urinalysis No Micro (Auto)   Result Value Ref Range    Color, UA yellow     Clarity, UA clear     Glucose, UA POC neg mg/dL    Bilirubin, UA      Ketones, UA      Spec Grav, UA      Blood, UA POC neg     pH, UA      Protein, UA POC neg mg/dL    Urobilinogen, UA      Leukocytes, UA small     Nitrite, UA neg      Additional Lab/Culture results: none    Imaging Reviewed during this Office Visit: none  (results were independently reviewed by physician and radiology report verified)    Physical Exam:    There were no vitals filed for this visit.  Body mass index is 25.99 kg/m².  Patient is a 53 y.o. female in no acute distress and alert and oriented to person, place and time.    Assessment and Plan   Assessment   1. Recurrent UTI            Plan    Return in about 1 year (around 10/10/2025).  Patient doing well using  Bactrim before and after sexual intercourse to prevent recurrent UTI's.

## 2024-10-16 LAB — CYTOLOGY REPORT: NORMAL

## 2024-11-04 ENCOUNTER — HOSPITAL ENCOUNTER (OUTPATIENT)
Dept: MAMMOGRAPHY | Age: 54
Discharge: HOME | End: 2024-11-06
Payer: COMMERCIAL

## 2024-11-04 DIAGNOSIS — Z12.31 ENCOUNTER FOR SCREENING MAMMOGRAM FOR BREAST CANCER: ICD-10-CM

## 2024-11-04 PROCEDURE — 77063 BREAST TOMOSYNTHESIS BI: CPT
